# Patient Record
Sex: MALE | Race: WHITE
[De-identification: names, ages, dates, MRNs, and addresses within clinical notes are randomized per-mention and may not be internally consistent; named-entity substitution may affect disease eponyms.]

---

## 2017-01-19 ENCOUNTER — RECORD ABSTRACTING (OUTPATIENT)
Age: 32
End: 2017-01-19

## 2017-01-19 DIAGNOSIS — F17.200 NICOTINE DEPENDENCE, UNSPECIFIED, UNCOMPLICATED: ICD-10-CM

## 2017-01-19 DIAGNOSIS — Z84.0 FAMILY HISTORY OF DISEASES OF THE SKIN AND SUBCUTANEOUS TISSUE: ICD-10-CM

## 2017-01-19 RX ORDER — CLOBETASOL PROPIONATE 0.05 %
0.05 SOLUTION, NON-ORAL TOPICAL
Refills: 0 | Status: ACTIVE | COMMUNITY

## 2017-01-19 RX ORDER — CLOBETASOL PROPIONATE 0.5 MG/G
0.05 OINTMENT TOPICAL
Refills: 0 | Status: ACTIVE | COMMUNITY

## 2017-01-20 ENCOUNTER — RECORD ABSTRACTING (OUTPATIENT)
Age: 32
End: 2017-01-20

## 2017-01-20 DIAGNOSIS — Z87.2 PERSONAL HISTORY OF DISEASES OF THE SKIN AND SUBCUTANEOUS TISSUE: ICD-10-CM

## 2017-02-13 ENCOUNTER — APPOINTMENT (OUTPATIENT)
Dept: DERMATOLOGY | Facility: CLINIC | Age: 32
End: 2017-02-13

## 2017-02-25 ENCOUNTER — MEDICATION RENEWAL (OUTPATIENT)
Age: 32
End: 2017-02-25

## 2017-03-24 ENCOUNTER — APPOINTMENT (OUTPATIENT)
Dept: DERMATOLOGY | Facility: CLINIC | Age: 32
End: 2017-03-24

## 2017-04-11 ENCOUNTER — APPOINTMENT (OUTPATIENT)
Dept: DERMATOLOGY | Facility: CLINIC | Age: 32
End: 2017-04-11

## 2017-04-11 RX ORDER — USTEKINUMAB 90 MG/ML
90 INJECTION, SOLUTION SUBCUTANEOUS
Refills: 0 | Status: ACTIVE | COMMUNITY

## 2017-04-18 ENCOUNTER — APPOINTMENT (OUTPATIENT)
Dept: DERMATOLOGY | Facility: CLINIC | Age: 32
End: 2017-04-18

## 2017-04-18 VITALS — HEIGHT: 74 IN | BODY MASS INDEX: 27.59 KG/M2 | WEIGHT: 215 LBS

## 2017-04-18 DIAGNOSIS — Z91.89 OTHER SPECIFIED PERSONAL RISK FACTORS, NOT ELSEWHERE CLASSIFIED: ICD-10-CM

## 2017-04-18 RX ORDER — USTEKINUMAB 90 MG/ML
90 INJECTION, SOLUTION SUBCUTANEOUS
Refills: 0 | Status: COMPLETED | OUTPATIENT
Start: 2017-04-18

## 2017-04-18 RX ADMIN — Medication 0 MG/ML: at 00:00

## 2017-07-27 RX ORDER — USTEKINUMAB 90 MG/ML
90 INJECTION, SOLUTION SUBCUTANEOUS
Qty: 1 | Refills: 3 | Status: ACTIVE | COMMUNITY
Start: 2017-02-25

## 2017-08-04 ENCOUNTER — APPOINTMENT (OUTPATIENT)
Dept: DERMATOLOGY | Facility: CLINIC | Age: 32
End: 2017-08-04
Payer: MEDICAID

## 2017-08-04 VITALS — WEIGHT: 230 LBS | BODY MASS INDEX: 28.6 KG/M2 | HEIGHT: 75 IN

## 2017-08-04 DIAGNOSIS — L40.0 PSORIASIS VULGARIS: ICD-10-CM

## 2017-08-04 PROCEDURE — 99213 OFFICE O/P EST LOW 20 MIN: CPT

## 2017-08-04 RX ORDER — USTEKINUMAB 90 MG/ML
90 INJECTION, SOLUTION SUBCUTANEOUS
Qty: 0 | Refills: 0 | Status: COMPLETED | OUTPATIENT
Start: 2017-08-04

## 2017-08-04 RX ADMIN — Medication 0 MG/ML: at 00:00

## 2017-11-11 ENCOUNTER — APPOINTMENT (OUTPATIENT)
Dept: DERMATOLOGY | Facility: CLINIC | Age: 32
End: 2017-11-11

## 2018-02-06 ENCOUNTER — EMERGENCY (EMERGENCY)
Facility: HOSPITAL | Age: 33
LOS: 0 days | Discharge: HOME | End: 2018-02-06
Attending: EMERGENCY MEDICINE | Admitting: EMERGENCY MEDICINE
Payer: COMMERCIAL

## 2018-02-06 VITALS
HEIGHT: 75 IN | TEMPERATURE: 98 F | SYSTOLIC BLOOD PRESSURE: 143 MMHG | WEIGHT: 244.93 LBS | OXYGEN SATURATION: 99 % | RESPIRATION RATE: 15 BRPM | DIASTOLIC BLOOD PRESSURE: 89 MMHG | HEART RATE: 96 BPM

## 2018-02-06 DIAGNOSIS — Z04.1 ENCOUNTER FOR EXAMINATION AND OBSERVATION FOLLOWING TRANSPORT ACCIDENT: ICD-10-CM
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
## 2018-02-06 DIAGNOSIS — S39.012A STRAIN OF MUSCLE, FASCIA AND TENDON OF LOWER BACK, INITIAL ENCOUNTER: ICD-10-CM
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
## 2018-02-06 PROCEDURE — 99283 EMERGENCY DEPT VISIT LOW MDM: CPT

## 2018-02-06 RX ORDER — IBUPROFEN 200 MG
600 TABLET ORAL ONCE
Qty: 0 | Refills: 0 | Status: COMPLETED | OUTPATIENT
Start: 2018-02-06 | End: 2018-02-06

## 2018-02-06 RX ADMIN — Medication 600 MILLIGRAM(S): at 11:32

## 2018-02-06 NOTE — ED STATDOCS - NS_ ATTENDINGSCRIBEDETAILS _ED_A_ED_FT
Hermilo Boggs DO (Attending): The history, relevant review of systems, past medical and surgical history, medical decision making, and physical examination was documented by the scribe in my presence and I attest to the accuracy of the documentation.
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
## 2018-02-06 NOTE — ED STATDOCS - ATTENDING CONTRIBUTION TO CARE
I, Hermilo Boggs DO,  performed the initial face to face bedside interview with this patient regarding history of present illness, review of symptoms and relevant past medical, social and family history.  I completed an independent physical examination.  I was the initial provider who evaluated this patient. I have signed out the follow up of any pending tests (i.e. labs, radiological studies) to the ACP.  I have communicated the patient’s plan of care and disposition with the ACP.
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
## 2018-02-06 NOTE — ED STATDOCS - MUSCULOSKELETAL, MLM
No midline CTL tenderness.  No bony tenderness to left shoulder.  Pulses +2, cap refill normal, sensation intact.

## 2020-05-12 ENCOUNTER — INPATIENT (INPATIENT)
Facility: HOSPITAL | Age: 35
LOS: 2 days | Discharge: HOME | DRG: 253 | End: 2020-05-15
Attending: FAMILY MEDICINE | Admitting: FAMILY MEDICINE
Payer: COMMERCIAL

## 2020-05-12 VITALS
DIASTOLIC BLOOD PRESSURE: 98 MMHG | OXYGEN SATURATION: 96 % | RESPIRATION RATE: 18 BRPM | HEIGHT: 75 IN | WEIGHT: 250 LBS | HEART RATE: 130 BPM | TEMPERATURE: 98 F | SYSTOLIC BLOOD PRESSURE: 128 MMHG

## 2020-05-12 DIAGNOSIS — K92.2 GASTROINTESTINAL HEMORRHAGE, UNSPECIFIED: ICD-10-CM

## 2020-05-12 LAB
ALBUMIN SERPL ELPH-MCNC: 3.5 G/DL — SIGNIFICANT CHANGE UP (ref 3.3–5)
ALP SERPL-CCNC: 97 U/L — SIGNIFICANT CHANGE UP (ref 40–120)
ALT FLD-CCNC: 27 U/L — SIGNIFICANT CHANGE UP (ref 12–78)
ANION GAP SERPL CALC-SCNC: 7 MMOL/L — SIGNIFICANT CHANGE UP (ref 5–17)
APTT BLD: 30.4 SEC — SIGNIFICANT CHANGE UP (ref 27.5–36.3)
AST SERPL-CCNC: 19 U/L — SIGNIFICANT CHANGE UP (ref 15–37)
BASOPHILS # BLD AUTO: 0.07 K/UL — SIGNIFICANT CHANGE UP (ref 0–0.2)
BASOPHILS NFR BLD AUTO: 0.5 % — SIGNIFICANT CHANGE UP (ref 0–2)
BILIRUB SERPL-MCNC: 0.6 MG/DL — SIGNIFICANT CHANGE UP (ref 0.2–1.2)
BUN SERPL-MCNC: 32 MG/DL — HIGH (ref 7–23)
CALCIUM SERPL-MCNC: 8.6 MG/DL — SIGNIFICANT CHANGE UP (ref 8.5–10.1)
CHLORIDE SERPL-SCNC: 107 MMOL/L — SIGNIFICANT CHANGE UP (ref 96–108)
CO2 SERPL-SCNC: 23 MMOL/L — SIGNIFICANT CHANGE UP (ref 22–31)
CREAT SERPL-MCNC: 1.07 MG/DL — SIGNIFICANT CHANGE UP (ref 0.5–1.3)
EOSINOPHIL # BLD AUTO: 0.05 K/UL — SIGNIFICANT CHANGE UP (ref 0–0.5)
EOSINOPHIL NFR BLD AUTO: 0.4 % — SIGNIFICANT CHANGE UP (ref 0–6)
GLUCOSE SERPL-MCNC: 123 MG/DL — HIGH (ref 70–99)
HCT VFR BLD CALC: 31.6 % — LOW (ref 39–50)
HCT VFR BLD CALC: 35 % — LOW (ref 39–50)
HGB BLD-MCNC: 10.7 G/DL — LOW (ref 13–17)
HGB BLD-MCNC: 12.1 G/DL — LOW (ref 13–17)
IMM GRANULOCYTES NFR BLD AUTO: 0.5 % — SIGNIFICANT CHANGE UP (ref 0–1.5)
INR BLD: 1.12 RATIO — SIGNIFICANT CHANGE UP (ref 0.88–1.16)
LIDOCAIN IGE QN: 177 U/L — SIGNIFICANT CHANGE UP (ref 73–393)
LYMPHOCYTES # BLD AUTO: 17.5 % — SIGNIFICANT CHANGE UP (ref 13–44)
LYMPHOCYTES # BLD AUTO: 2.26 K/UL — SIGNIFICANT CHANGE UP (ref 1–3.3)
MCHC RBC-ENTMCNC: 30.9 PG — SIGNIFICANT CHANGE UP (ref 27–34)
MCHC RBC-ENTMCNC: 34.6 GM/DL — SIGNIFICANT CHANGE UP (ref 32–36)
MCV RBC AUTO: 89.5 FL — SIGNIFICANT CHANGE UP (ref 80–100)
MONOCYTES # BLD AUTO: 0.81 K/UL — SIGNIFICANT CHANGE UP (ref 0–0.9)
MONOCYTES NFR BLD AUTO: 6.3 % — SIGNIFICANT CHANGE UP (ref 2–14)
NEUTROPHILS # BLD AUTO: 9.67 K/UL — HIGH (ref 1.8–7.4)
NEUTROPHILS NFR BLD AUTO: 74.8 % — SIGNIFICANT CHANGE UP (ref 43–77)
PLATELET # BLD AUTO: 346 K/UL — SIGNIFICANT CHANGE UP (ref 150–400)
POTASSIUM SERPL-MCNC: 4 MMOL/L — SIGNIFICANT CHANGE UP (ref 3.5–5.3)
POTASSIUM SERPL-SCNC: 4 MMOL/L — SIGNIFICANT CHANGE UP (ref 3.5–5.3)
PROT SERPL-MCNC: 7 GM/DL — SIGNIFICANT CHANGE UP (ref 6–8.3)
PROTHROM AB SERPL-ACNC: 12.5 SEC — SIGNIFICANT CHANGE UP (ref 10–12.9)
RBC # BLD: 3.91 M/UL — LOW (ref 4.2–5.8)
RBC # FLD: 12 % — SIGNIFICANT CHANGE UP (ref 10.3–14.5)
SARS-COV-2 RNA SPEC QL NAA+PROBE: SIGNIFICANT CHANGE UP
SODIUM SERPL-SCNC: 137 MMOL/L — SIGNIFICANT CHANGE UP (ref 135–145)
WBC # BLD: 12.92 K/UL — HIGH (ref 3.8–10.5)
WBC # FLD AUTO: 12.92 K/UL — HIGH (ref 3.8–10.5)

## 2020-05-12 PROCEDURE — 88312 SPECIAL STAINS GROUP 1: CPT

## 2020-05-12 PROCEDURE — 99223 1ST HOSP IP/OBS HIGH 75: CPT

## 2020-05-12 PROCEDURE — 88305 TISSUE EXAM BY PATHOLOGIST: CPT

## 2020-05-12 PROCEDURE — 85018 HEMOGLOBIN: CPT

## 2020-05-12 PROCEDURE — 83540 ASSAY OF IRON: CPT

## 2020-05-12 PROCEDURE — P9016: CPT

## 2020-05-12 PROCEDURE — 84100 ASSAY OF PHOSPHORUS: CPT

## 2020-05-12 PROCEDURE — 85027 COMPLETE CBC AUTOMATED: CPT

## 2020-05-12 PROCEDURE — 74177 CT ABD & PELVIS W/CONTRAST: CPT

## 2020-05-12 PROCEDURE — 83036 HEMOGLOBIN GLYCOSYLATED A1C: CPT

## 2020-05-12 PROCEDURE — C9113: CPT

## 2020-05-12 PROCEDURE — 83735 ASSAY OF MAGNESIUM: CPT

## 2020-05-12 PROCEDURE — 85014 HEMATOCRIT: CPT

## 2020-05-12 PROCEDURE — 93010 ELECTROCARDIOGRAM REPORT: CPT

## 2020-05-12 PROCEDURE — 86920 COMPATIBILITY TEST SPIN: CPT

## 2020-05-12 PROCEDURE — 88313 SPECIAL STAINS GROUP 2: CPT

## 2020-05-12 PROCEDURE — 87635 SARS-COV-2 COVID-19 AMP PRB: CPT

## 2020-05-12 PROCEDURE — 85025 COMPLETE CBC W/AUTO DIFF WBC: CPT

## 2020-05-12 PROCEDURE — 82728 ASSAY OF FERRITIN: CPT

## 2020-05-12 PROCEDURE — 36430 TRANSFUSION BLD/BLD COMPNT: CPT

## 2020-05-12 PROCEDURE — 83550 IRON BINDING TEST: CPT

## 2020-05-12 PROCEDURE — 80048 BASIC METABOLIC PNL TOTAL CA: CPT

## 2020-05-12 PROCEDURE — 71045 X-RAY EXAM CHEST 1 VIEW: CPT | Mod: 26

## 2020-05-12 PROCEDURE — 99497 ADVNCD CARE PLAN 30 MIN: CPT

## 2020-05-12 PROCEDURE — 82746 ASSAY OF FOLIC ACID SERUM: CPT

## 2020-05-12 PROCEDURE — 82607 VITAMIN B-12: CPT

## 2020-05-12 PROCEDURE — 80053 COMPREHEN METABOLIC PANEL: CPT

## 2020-05-12 PROCEDURE — 78290 INTESTINE IMAGING: CPT

## 2020-05-12 PROCEDURE — 36415 COLL VENOUS BLD VENIPUNCTURE: CPT

## 2020-05-12 PROCEDURE — A9512: CPT

## 2020-05-12 RX ORDER — CALCIUM CARBONATE 500(1250)
3 TABLET ORAL EVERY 6 HOURS
Refills: 0 | Status: DISCONTINUED | OUTPATIENT
Start: 2020-05-12 | End: 2020-05-15

## 2020-05-12 RX ORDER — ONDANSETRON 8 MG/1
4 TABLET, FILM COATED ORAL EVERY 6 HOURS
Refills: 0 | Status: DISCONTINUED | OUTPATIENT
Start: 2020-05-12 | End: 2020-05-15

## 2020-05-12 RX ORDER — ONDANSETRON 8 MG/1
4 TABLET, FILM COATED ORAL ONCE
Refills: 0 | Status: COMPLETED | OUTPATIENT
Start: 2020-05-12 | End: 2020-05-12

## 2020-05-12 RX ORDER — ACETAMINOPHEN 500 MG
650 TABLET ORAL ONCE
Refills: 0 | Status: DISCONTINUED | OUTPATIENT
Start: 2020-05-12 | End: 2020-05-15

## 2020-05-12 RX ORDER — USTEKINUMAB 45 MG/.5ML
0 INJECTION, SOLUTION SUBCUTANEOUS
Qty: 0 | Refills: 0 | DISCHARGE

## 2020-05-12 RX ORDER — SODIUM CHLORIDE 9 MG/ML
1000 INJECTION, SOLUTION INTRAVENOUS
Refills: 0 | Status: DISCONTINUED | OUTPATIENT
Start: 2020-05-12 | End: 2020-05-14

## 2020-05-12 RX ORDER — SODIUM CHLORIDE 9 MG/ML
1000 INJECTION INTRAMUSCULAR; INTRAVENOUS; SUBCUTANEOUS ONCE
Refills: 0 | Status: COMPLETED | OUTPATIENT
Start: 2020-05-12 | End: 2020-05-12

## 2020-05-12 RX ORDER — PANTOPRAZOLE SODIUM 20 MG/1
8 TABLET, DELAYED RELEASE ORAL
Qty: 80 | Refills: 0 | Status: DISCONTINUED | OUTPATIENT
Start: 2020-05-12 | End: 2020-05-13

## 2020-05-12 RX ORDER — PANTOPRAZOLE SODIUM 20 MG/1
80 TABLET, DELAYED RELEASE ORAL ONCE
Refills: 0 | Status: COMPLETED | OUTPATIENT
Start: 2020-05-12 | End: 2020-05-12

## 2020-05-12 RX ORDER — ACETAMINOPHEN 500 MG
650 TABLET ORAL EVERY 6 HOURS
Refills: 0 | Status: DISCONTINUED | OUTPATIENT
Start: 2020-05-12 | End: 2020-05-15

## 2020-05-12 RX ORDER — ONDANSETRON 8 MG/1
4 TABLET, FILM COATED ORAL ONCE
Refills: 0 | Status: DISCONTINUED | OUTPATIENT
Start: 2020-05-12 | End: 2020-05-15

## 2020-05-12 RX ADMIN — SODIUM CHLORIDE 1000 MILLILITER(S): 9 INJECTION INTRAMUSCULAR; INTRAVENOUS; SUBCUTANEOUS at 12:59

## 2020-05-12 RX ADMIN — PANTOPRAZOLE SODIUM 10 MG/HR: 20 TABLET, DELAYED RELEASE ORAL at 16:10

## 2020-05-12 RX ADMIN — PANTOPRAZOLE SODIUM 80 MILLIGRAM(S): 20 TABLET, DELAYED RELEASE ORAL at 13:14

## 2020-05-12 RX ADMIN — SODIUM CHLORIDE 75 MILLILITER(S): 9 INJECTION, SOLUTION INTRAVENOUS at 19:55

## 2020-05-12 RX ADMIN — ONDANSETRON 4 MILLIGRAM(S): 8 TABLET, FILM COATED ORAL at 21:10

## 2020-05-12 RX ADMIN — ONDANSETRON 4 MILLIGRAM(S): 8 TABLET, FILM COATED ORAL at 12:59

## 2020-05-12 NOTE — ED ADULT TRIAGE NOTE - CHIEF COMPLAINT QUOTE
Patient comes to ED for nausea, and black stools since yesterday. denies any blood thinners. Patient denies any pain or discomfort. denies fevers or chills

## 2020-05-12 NOTE — H&P ADULT - NSHPREVIEWOFSYSTEMS_GEN_ALL_CORE
REVIEW OF SYSTEMS:    CONSTITUTIONAL: No weakness, fevers or chills  EYES/ENT: No visual changes;  No vertigo or throat pain   NECK: No pain or stiffness  RESPIRATORY: No cough, wheezing, hemoptysis; No shortness of breath  CARDIOVASCULAR: No chest pain or palpitations  GASTROINTESTINAL: No abdominal or epigastric pain. +  nausea, no vomiting, or hematemesis; No diarrhea or constipation. +  melena , no  hematochezia.  GENITOURINARY: No dysuria, frequency or hematuria  NEUROLOGICAL: No numbness or weakness  SKIN: No itching, burning, rashes, or lesions   All other review of systems is negative unless indicated above.

## 2020-05-12 NOTE — H&P ADULT - HISTORY OF PRESENT ILLNESS
34M hx gerd presents to the ED for black stool since yesterday. 4 episodes with soft semi formed stools. no dizziness cp sob abd pain. states he takes motrin 2x per week for years. non-drinkers. no pain at this time 33 y/o male with PMH of GERD, psoriasis on  Stelara presents to the  with c/o black stool since yesterday. 4 episodes with soft semi formed stools and nausea . Denies cp, dizziness  sob, abd pain, has some nausea, no vomiting. Patient states takes Motrin 2x per week for years for headaches.     in ED -  T(F): 98.3HR: 94  (94 - 130) BP: 128/98 RR: 18  SpO2: 97% (96% - 97%) Hemoglobin + Hematocrit (05.12.20 @ 17:07)   Hemoglobin: 10.7 g/dL Hematocrit: 31.6 %Complete Blood Count + Automated Diff (05.12.20 @ 12:55)  Hemoglobin: 12.1 g/dL   Mean Cell Hemoglobin: 30.9 pg   Mean Cell Hemoglobin Conc: 34.6 gm/dL  s/p IV fluid and PPI

## 2020-05-12 NOTE — PATIENT PROFILE ADULT - DISASTER - DO YOU WANT TO COMPLETE THE HCP AND NAME A HEALTH CARE AGENT
May contact: Mihaela Bryant (Friend) [phone]             Praveena Duggan (Girlfriend) [phone]/no

## 2020-05-12 NOTE — ED PROVIDER NOTE - NS ED ROS FT
Constitutional: No fever or chills  Eyes: No visual changes  HEENT: No throat pain  CV: No chest pain  Resp: No SOB no cough  GI: No abd pain, nausea or vomiting + gi bleed  : No dysuria  MSK: No musculoskeletal pain  Skin: No rash  Neuro: No headache

## 2020-05-12 NOTE — ED ADULT NURSE NOTE - NSIMPLEMENTINTERV_GEN_ALL_ED
Implemented All Universal Safety Interventions:  Paulina to call system. Call bell, personal items and telephone within reach. Instruct patient to call for assistance. Room bathroom lighting operational. Non-slip footwear when patient is off stretcher. Physically safe environment: no spills, clutter or unnecessary equipment. Stretcher in lowest position, wheels locked, appropriate side rails in place.

## 2020-05-12 NOTE — PATIENT PROFILE ADULT - DISASTER - LONGEST PERIOD TOBACCO-FREE
Discussed results with patient. Will send in bactrim since she is still having some symptoms.    3.5 years quit smoking

## 2020-05-12 NOTE — ED PROVIDER NOTE - PHYSICAL EXAMINATION
Constitutional: NAD AAOx3  Eyes: PERRLA EOMI  Head: Normocephalic atraumatic  Mouth: MMM  Cardiac: tachycardic  Resp: Lungs CTAB  GI: Abd s/nt/nd  Neuro: CN2-12 intact  Skin: No rashes
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
## 2020-05-12 NOTE — H&P ADULT - ASSESSMENT
33 y/o male with PMH of GERD, psoriasis on  Stelara presents to the  with c/o black stool since yesterday. 4 episodes with soft semi formed stools and nausea . Denies cp, dizziness  sob, abd pain, has some nausea, no vomiting. Patient states takes Motrin 2x per week for years for headaches.     in ED -  T(F): 98.3HR: 94  (94 - 130) BP: 128/98 RR: 18  SpO2: 97% (96% - 97%) Hemoglobin + Hematocrit (05.12.20 @ 17:07)   Hemoglobin: 10.7 g/dL Hematocrit: 31.6 %Complete Blood Count + Automated Diff (05.12.20 @ 12:55)  Hemoglobin: 12.1 g/dL   Mean Cell Hemoglobin: 30.9 pg   Mean Cell Hemoglobin Conc: 34.6 gm/dL  s/p IV fluid and PPI     * Melena suspected upper GI bleed with underlying GERD  - med-surg  - IV fluids  - IV PPI  - clear liquids, possible endoscopy eva  - GI evluation  * Anemia due to acute blood loss  - serial h/h monitoring   * Mild leukocytosis , likely reactive repeat in am  * Hyperglycemia- check A1C     Advanced directives  - spend 17 min  - FULL code    DVT proph - IPC  IMPROVE VTE Individual Risk Assessment  RISK                                                                Points  [  ] Previous VTE                                                  3  [  ] Thrombophilia                                               2  [  ] Lower limb paralysis                                      2        (unable to hold up >15 seconds)    [  ] Current Cancer                                              2         (within 6 months)  [  ] Immobilization > 24 hrs                                1  [  ] ICU/CCU stay > 24 hours                              1  [  ] Age > 60                                                      1  IMPROVE VTE Score ______0___    IMPROVE Score 0-1: Low Risk, No VTE prophylaxis required for most patients, encourage ambulation.   IMPROVE Score 2-3: At risk, pharmacologic VTE prophylaxis is indicated for most patients (in the absence of a contraindication)  IMPROVE Score > or = 4: High Risk, pharmacologic VTE prophylaxis is indicated for most patients (in the absence of a contraindication)    Time spend 72 minutes

## 2020-05-12 NOTE — ED PROVIDER NOTE - PROGRESS NOTE DETAILS
33 y/o M with PMH of GERD presents with nausea and black stool since yesterday. Denies fever, chills, vomiting, abdominal pain. States this has never happened in the past. Called GI today, Dr. Powers, advised to go to ED. Pt cannot recall name of GERD medication from home or if he took dose prior to arrival. Denies recent ETOH use, use of iron supplementation or pepto bismol use. PE: Well appearing. Cardiac: s1s2, tachycardic. Lungs: CTAB. Abdomen: NBS x4, soft, nontender. Guaiac +. Lot #[id number], EXP 8/31/20. A/P: UGIB. Plan for labs, protonix, EKG, CXR. GI consult. Likely admission. - Stew Girard PA-C D/w [facility] presley for Dr. Wang. Will follow patient on floor upon admission. - Stew Girard PA-C

## 2020-05-13 ENCOUNTER — RESULT REVIEW (OUTPATIENT)
Age: 35
End: 2020-05-13

## 2020-05-13 LAB
A1C WITH ESTIMATED AVERAGE GLUCOSE RESULT: 4.7 % — SIGNIFICANT CHANGE UP (ref 4–5.6)
ADD ON TEST-SPECIMEN IN LAB: SIGNIFICANT CHANGE UP
ANION GAP SERPL CALC-SCNC: 5 MMOL/L — SIGNIFICANT CHANGE UP (ref 5–17)
BASOPHILS # BLD AUTO: 0.05 K/UL — SIGNIFICANT CHANGE UP (ref 0–0.2)
BASOPHILS NFR BLD AUTO: 0.5 % — SIGNIFICANT CHANGE UP (ref 0–2)
BUN SERPL-MCNC: 22 MG/DL — SIGNIFICANT CHANGE UP (ref 7–23)
CALCIUM SERPL-MCNC: 8 MG/DL — LOW (ref 8.5–10.1)
CHLORIDE SERPL-SCNC: 108 MMOL/L — SIGNIFICANT CHANGE UP (ref 96–108)
CO2 SERPL-SCNC: 27 MMOL/L — SIGNIFICANT CHANGE UP (ref 22–31)
CREAT SERPL-MCNC: 0.89 MG/DL — SIGNIFICANT CHANGE UP (ref 0.5–1.3)
EOSINOPHIL # BLD AUTO: 0.08 K/UL — SIGNIFICANT CHANGE UP (ref 0–0.5)
EOSINOPHIL NFR BLD AUTO: 0.9 % — SIGNIFICANT CHANGE UP (ref 0–6)
ESTIMATED AVERAGE GLUCOSE: 88 MG/DL — SIGNIFICANT CHANGE UP (ref 68–114)
GLUCOSE SERPL-MCNC: 109 MG/DL — HIGH (ref 70–99)
HCT VFR BLD CALC: 22.9 % — LOW (ref 39–50)
HCT VFR BLD CALC: 24.8 % — LOW (ref 39–50)
HCT VFR BLD CALC: 24.9 % — LOW (ref 39–50)
HCT VFR BLD CALC: 25.8 % — LOW (ref 39–50)
HGB BLD-MCNC: 7.9 G/DL — LOW (ref 13–17)
HGB BLD-MCNC: 8.5 G/DL — LOW (ref 13–17)
HGB BLD-MCNC: 8.7 G/DL — LOW (ref 13–17)
HGB BLD-MCNC: 8.8 G/DL — LOW (ref 13–17)
IMM GRANULOCYTES NFR BLD AUTO: 0.4 % — SIGNIFICANT CHANGE UP (ref 0–1.5)
LYMPHOCYTES # BLD AUTO: 2.18 K/UL — SIGNIFICANT CHANGE UP (ref 1–3.3)
LYMPHOCYTES # BLD AUTO: 23.5 % — SIGNIFICANT CHANGE UP (ref 13–44)
MAGNESIUM SERPL-MCNC: 1.8 MG/DL — SIGNIFICANT CHANGE UP (ref 1.6–2.6)
MCHC RBC-ENTMCNC: 31.6 PG — SIGNIFICANT CHANGE UP (ref 27–34)
MCHC RBC-ENTMCNC: 35.1 GM/DL — SIGNIFICANT CHANGE UP (ref 32–36)
MCV RBC AUTO: 90.2 FL — SIGNIFICANT CHANGE UP (ref 80–100)
MONOCYTES # BLD AUTO: 0.58 K/UL — SIGNIFICANT CHANGE UP (ref 0–0.9)
MONOCYTES NFR BLD AUTO: 6.3 % — SIGNIFICANT CHANGE UP (ref 2–14)
NEUTROPHILS # BLD AUTO: 6.33 K/UL — SIGNIFICANT CHANGE UP (ref 1.8–7.4)
NEUTROPHILS NFR BLD AUTO: 68.4 % — SIGNIFICANT CHANGE UP (ref 43–77)
PHOSPHATE SERPL-MCNC: 2.3 MG/DL — LOW (ref 2.5–4.5)
PLATELET # BLD AUTO: 273 K/UL — SIGNIFICANT CHANGE UP (ref 150–400)
POTASSIUM SERPL-MCNC: 3.9 MMOL/L — SIGNIFICANT CHANGE UP (ref 3.5–5.3)
POTASSIUM SERPL-SCNC: 3.9 MMOL/L — SIGNIFICANT CHANGE UP (ref 3.5–5.3)
RBC # BLD: 2.75 M/UL — LOW (ref 4.2–5.8)
RBC # FLD: 12.4 % — SIGNIFICANT CHANGE UP (ref 10.3–14.5)
SODIUM SERPL-SCNC: 140 MMOL/L — SIGNIFICANT CHANGE UP (ref 135–145)
WBC # BLD: 9.26 K/UL — SIGNIFICANT CHANGE UP (ref 3.8–10.5)
WBC # FLD AUTO: 9.26 K/UL — SIGNIFICANT CHANGE UP (ref 3.8–10.5)

## 2020-05-13 PROCEDURE — 99233 SBSQ HOSP IP/OBS HIGH 50: CPT

## 2020-05-13 PROCEDURE — 88312 SPECIAL STAINS GROUP 1: CPT | Mod: 26

## 2020-05-13 PROCEDURE — 88305 TISSUE EXAM BY PATHOLOGIST: CPT | Mod: 26

## 2020-05-13 PROCEDURE — 88313 SPECIAL STAINS GROUP 2: CPT | Mod: 26

## 2020-05-13 PROCEDURE — 74177 CT ABD & PELVIS W/CONTRAST: CPT | Mod: 26

## 2020-05-13 RX ORDER — SOD SULF/SODIUM/NAHCO3/KCL/PEG
4000 SOLUTION, RECONSTITUTED, ORAL ORAL ONCE
Refills: 0 | Status: COMPLETED | OUTPATIENT
Start: 2020-05-13 | End: 2020-05-13

## 2020-05-13 RX ORDER — OXYCODONE HYDROCHLORIDE 5 MG/1
5 TABLET ORAL ONCE
Refills: 0 | Status: DISCONTINUED | OUTPATIENT
Start: 2020-05-13 | End: 2020-05-13

## 2020-05-13 RX ORDER — FENTANYL CITRATE 50 UG/ML
50 INJECTION INTRAVENOUS
Refills: 0 | Status: DISCONTINUED | OUTPATIENT
Start: 2020-05-13 | End: 2020-05-13

## 2020-05-13 RX ORDER — ONDANSETRON 8 MG/1
4 TABLET, FILM COATED ORAL ONCE
Refills: 0 | Status: DISCONTINUED | OUTPATIENT
Start: 2020-05-13 | End: 2020-05-13

## 2020-05-13 RX ADMIN — SODIUM CHLORIDE 75 MILLILITER(S): 9 INJECTION, SOLUTION INTRAVENOUS at 22:38

## 2020-05-13 RX ADMIN — PANTOPRAZOLE SODIUM 10 MG/HR: 20 TABLET, DELAYED RELEASE ORAL at 02:22

## 2020-05-13 RX ADMIN — Medication 4000 MILLILITER(S): at 22:36

## 2020-05-13 RX ADMIN — SODIUM CHLORIDE 75 MILLILITER(S): 9 INJECTION, SOLUTION INTRAVENOUS at 09:23

## 2020-05-13 RX ADMIN — Medication 650 MILLIGRAM(S): at 10:00

## 2020-05-13 NOTE — PROGRESS NOTE ADULT - ASSESSMENT
33 y/o male with PMH of GERD, psoriasis on  Stelara presents to the HH with c/o black stool since yesterday. 4 episodes with soft semi formed stools and nausea . Denies cp, dizziness  sob, abd pain, has some nausea, no vomiting. Patient states takes Motrin 2x per week for years for headaches.     in ED -  T(F): 98.3HR: 94  (94 - 130) BP: 128/98 RR: 18  SpO2: 97% (96% - 97%) Hemoglobin + Hematocrit (05.12.20 @ 17:07)   Hemoglobin: 10.7 g/dL Hematocrit: 31.6 %Complete Blood Count + Automated Diff (05.12.20 @ 12:55)  Hemoglobin: 12.1 g/dL   Mean Cell Hemoglobin: 30.9 pg   Mean Cell Hemoglobin Conc: 34.6 gm/dL  s/p IV fluid and PPI     * Melena suspected upper GI bleed with underlying GERD  - med-surg  - IV fluids,  IV PPI  - 5/13 - s/p EGD Small sliding hiatal hernia, Possible minimal Barretts  - CT abd with IV contrast   - clear liquids,  GI evaluation in process - will need colonoscopy   * Anemia due to acute blood loss, worsening   - 1 unit PRBC   - serial h/h monitoring   * Mild leukocytosis , resolved likely reactive repeat in am  * Hyperglycemia- check A1C  4.7, likely stress induced     Advanced directives  - spend 17 min  - FULL code    DVT proph - IPC    Dispo - IV fluids, IV PPI, 1 unit PRBC , serial h/h , plan for colonoscopy

## 2020-05-13 NOTE — CONSULT NOTE ADULT - ASSESSMENT
Imp:  Melena with anemai, presumed upper GI bleed    Rec:  EGD discussed risks and benefits reviewed and patient agrees

## 2020-05-13 NOTE — CONSULT NOTE ADULT - SUBJECTIVE AND OBJECTIVE BOX
HPI:  33 y/o male with PMH of GERD, psoriasis on  Stelara presents to the HH with c/o black stool since yesterday. 4 episodes with soft semi formed stools and nausea . Denies cp, dizziness  sob, abd pain, has some nausea, no vomiting. Patient states takes Motrin 2x per week for years for headaches.     in ED -  T(F): 98.3HR: 94  (94 - 130) BP: 128/98 RR: 18  SpO2: 97% (96% - 97%) Hemoglobin + Hematocrit (05.12.20 @ 17:07)   Hemoglobin: 10.7 g/dL Hematocrit: 31.6 %Complete Blood Count + Automated Diff (05.12.20 @ 12:55)  Hemoglobin: 12.1 g/dL   Mean Cell Hemoglobin: 30.9 pg   Mean Cell Hemoglobin Conc: 34.6 gm/dL  s/p IV fluid and PPI (12 May 2020 14:34)      PAST MEDICAL & SURGICAL HISTORY:  GERD (gastroesophageal reflux disease)  No significant past surgical history      Home Medications:  pantoprazole 40 mg oral delayed release tablet: 1 tab(s) orally once a day (12 May 2020 14:26)  Stelara PFS 90 mg/mL subcutaneous solution: Inject once every 3 months  ***due end of May*** (12 May 2020 14:26)      MEDICATIONS  (STANDING):  lactated ringers. 1000 milliLiter(s) (75 mL/Hr) IV Continuous <Continuous>  pantoprazole Infusion 8 mG/Hr (10 mL/Hr) IV Continuous <Continuous>    MEDICATIONS  (PRN):  acetaminophen   Tablet .. 650 milliGRAM(s) Oral once PRN Mild Pain (1 - 3)  acetaminophen   Tablet .. 650 milliGRAM(s) Oral every 6 hours PRN Temp greater or equal to 38C (100.4F), Mild Pain (1 - 3)  calcium carbonate    500 mG (Tums) Chewable 3 Tablet(s) Chew every 6 hours PRN Dyspepsia  ondansetron Injectable 4 milliGRAM(s) IV Push every 6 hours PRN Nausea  ondansetron Injectable 4 milliGRAM(s) IV Push once PRN Nausea and/or Vomiting      Allergies    No Known Allergies    Intolerances        SOCIAL HISTORY:    FAMILY HISTORY:  FH: type 2 diabetes (Father)      ROS  As above  Otherwise unremarkable    Vital Signs Last 24 Hrs  T(C): 36.6 (13 May 2020 08:20), Max: 36.8 (12 May 2020 12:15)  T(F): 97.8 (13 May 2020 08:20), Max: 98.3 (12 May 2020 12:15)  HR: 82 (13 May 2020 08:20) (82 - 130)  BP: 111/53 (13 May 2020 08:20) (105/65 - 142/83)  BP(mean): --  RR: 20 (13 May 2020 08:20) (18 - 20)  SpO2: 99% (13 May 2020 08:20) (96% - 100%)    Constitutional: NAD, well-developed  Respiratory: CTAB  Cardiovascular: S1 and S2, RRR  Gastrointestinal: BS+, soft, NT/ND  Extremities: No peripheral edema  Psychiatric: Normal mood, normal affect  Skin: No rashes    LABS:                        8.7    9.26  )-----------( 273      ( 13 May 2020 06:31 )             24.8     05-13    140  |  108  |  22  ----------------------------<  109<H>  3.9   |  27  |  0.89    Ca    8.0<L>      13 May 2020 06:31  Phos  2.3     05-13  Mg     1.8     05-13    TPro  7.0  /  Alb  3.5  /  TBili  0.6  /  DBili  x   /  AST  19  /  ALT  27  /  AlkPhos  97  05-12    PT/INR - ( 12 May 2020 12:55 )   PT: 12.5 sec;   INR: 1.12 ratio         PTT - ( 12 May 2020 12:55 )  PTT:30.4 sec  LIVER FUNCTIONS - ( 12 May 2020 12:55 )  Alb: 3.5 g/dL / Pro: 7.0 gm/dL / ALK PHOS: 97 U/L / ALT: 27 U/L / AST: 19 U/L / GGT: x             RADIOLOGY & ADDITIONAL STUDIES:

## 2020-05-13 NOTE — PROGRESS NOTE ADULT - SUBJECTIVE AND OBJECTIVE BOX
Subjective:  Patient is a 34y old  Male who presents with a chief complaint of nausea, black stool     HPI:  35 y/o male with PMH of GERD, psoriasis on  Stelara presents to the HH with c/o black stool since yesterday. 4 episodes with soft semi formed stools and nausea . Denies cp, dizziness  sob, abd pain, has some nausea, no vomiting. Patient states takes Motrin 2x per week for years for headaches.     in ED -  T(F): 98.3HR: 94  (94 - 130) BP: 128/98 RR: 18  SpO2: 97% (96% - 97%) Hemoglobin + Hematocrit (05.12.20 @ 17:07)   Hemoglobin: 10.7 g/dL Hematocrit: 31.6 %Complete Blood Count + Automated Diff (05.12.20 @ 12:55)  Hemoglobin: 12.1 g/dL   Mean Cell Hemoglobin: 30.9 pg   Mean Cell Hemoglobin Conc: 34.6 gm/dL  s/p IV fluid and PPI (12 May 2020 14:34)    5/13 -  Patient seen and examined at bedside earlier today,  bloody stool overnight, denies abdominal pain, chest pain, dizziness, palpitations, POC discussed    Review of system- Rest of the review of system are negative except mentioned in HPI    OBJECTIVE:   T(C): 36.9 (05-13-20 @ 16:04), Max: 37.6 (05-13-20 @ 12:01)  HR: 86 (05-13-20 @ 12:56) (82 - 90)  BP: 108/61 (05-13-20 @ 16:04) (105/65 - 122/68)  RR: 20 (05-13-20 @ 16:04) (14 - 21)  SpO2: 99% (05-13-20 @ 16:04) (97% - 100%)  Wt(kg): --  Daily     Daily   CAPILLARY BLOOD GLUCOSE        PHYSICAL EXAM:  GENERAL: NAD  NERVOUS SYSTEM:  Alert & Oriented X3, non- focal exam,  Motor Strength 5/5 B/L upper and lower extremities; DTRs 2+ intact and symmetric  HEAD:  Atraumatic, Normocephalic  EYES: EOMI, PERRLA, conjunctiva and sclera clear  HEENT: Moist mucous membranes  NECK: Supple, No JVD  CHEST/LUNG: Clear to auscultation bilaterally; No rales, no rhonchi, no wheezing  HEART: Regular rate and rhythm; No murmurs, no rubs or gallops  ABDOMEN: Soft, Nontender, Nondistended; Bowel sounds present  GENITOURINARY- Voiding, no suprapubic tenderness  EXTREMITIES:  2+ Peripheral Pulses, No clubbing, cyanosis,   edema  MUSCULOSKELETAL:- No muscle tenderness, Muscle tone normal, No joint tenderness, no Joint swelling,  Joint ROM -normal  SKIN-no rash, no lesion    LABS: all reviewed                         7.9    x     )-----------( x        ( 13 May 2020 16:42 )             22.9     05-13    140  |  108  |  22  ----------------------------<  109<H>  3.9   |  27  |  0.89    Ca    8.0<L>      13 May 2020 06:31  Phos  2.3     05-13  Mg     1.8     05-13    TPro  7.0  /  Alb  3.5  /  TBili  0.6  /  DBili  x   /  AST  19  /  ALT  27  /  AlkPhos  97  05-12    PT/INR - ( 12 May 2020 12:55 )   PT: 12.5 sec;   INR: 1.12 ratio         PTT - ( 12 May 2020 12:55 )  PTT:30.4 sec          T(C): 36.9 (05-13-20 @ 16:04), Max: 37.6 (05-13-20 @ 12:01)  HR: 86 (05-13-20 @ 12:56) (82 - 90)  BP: 108/61 (05-13-20 @ 16:04) (105/65 - 122/68)  RR: 20 (05-13-20 @ 16:04) (14 - 21)  SpO2: 99% (05-13-20 @ 16:04) (97% - 100%)  Wt(kg): --      RECENT CULTURES:    RADIOLOGY & ADDITIONAL TESTS: all reviewed   EKG reviewed  < from: 12 Lead ECG (05.12.20 @ 12:21) >  Ventricular Rate 140 BPM    Atrial Rate 140 BPM    P-R Interval 138 ms    QRS Duration 82 ms    Q-T Interval 280 ms    QTC Calculation(Bezet) 427 ms    P Axis 61 degrees    R Axis 5 degrees    T Axis 40 degrees    Diagnosis Line Sinus tachycardia  Otherwise normal ECG  When compared with ECG of 18-NOV-2016 19:00,  Vent. rate has increased BY  77 BPM    < end of copied text >    < from: CT Abdomen and Pelvis w/ Oral Cont and w/ IV Cont (05.13.20 @ 13:44) >  CT of the Abdomen and Pelvis was performed with intravenous contrast.   Intravenous contrast: 90 ml Omnipaque 350. 10 ml discarded.  Oral contrast: positive contrast was administered.  Sagittal and coronal reformats were performed.    FINDINGS:    LOWER CHEST: Within normal limits.    LIVER: Within normal limits.  BILE DUCTS: Normal caliber.  GALLBLADDER: Within normal limits.  SPLEEN: Within normal limits.  PANCREAS: Within normal limits.  ADRENALS: Within normal limits.  KIDNEYS/URETERS: Within normal limits.    BLADDER: Within normal limits.  REPRODUCTIVE ORGANS: Prostate within normal limits.    BOWEL: No bowel obstruction. Appendix normal.  PERITONEUM: No ascites.  VESSELS: Within normal limits.  RETROPERITONEUM/LYMPH NODES: No lymphadenopathy.    ABDOMINAL WALL: Within normal limits.  BONES: Within normal limits.    IMPRESSION:     Unremarkable CT of the abdomen and pelvis. No gastrointestinal abnormality identified.    < end of copied text >        Current medications:  acetaminophen   Tablet .. 650 milliGRAM(s) Oral once PRN  acetaminophen   Tablet .. 650 milliGRAM(s) Oral every 6 hours PRN  calcium carbonate    500 mG (Tums) Chewable 3 Tablet(s) Chew every 6 hours PRN  lactated ringers. 1000 milliLiter(s) IV Continuous <Continuous>  ondansetron Injectable 4 milliGRAM(s) IV Push every 6 hours PRN  ondansetron Injectable 4 milliGRAM(s) IV Push once PRN

## 2020-05-13 NOTE — PROGRESS NOTE ADULT - SUBJECTIVE AND OBJECTIVE BOX
EGD:  Small sliding hiatal hernia  Possible minimal Barretts  No bleeding source    Rec:  CT abd/pel with PO and IV contrast to look for more distal source of bleeding

## 2020-05-14 LAB
ALBUMIN SERPL ELPH-MCNC: 3.1 G/DL — LOW (ref 3.3–5)
ALP SERPL-CCNC: 70 U/L — SIGNIFICANT CHANGE UP (ref 40–120)
ALT FLD-CCNC: 21 U/L — SIGNIFICANT CHANGE UP (ref 12–78)
ANION GAP SERPL CALC-SCNC: 6 MMOL/L — SIGNIFICANT CHANGE UP (ref 5–17)
AST SERPL-CCNC: 14 U/L — LOW (ref 15–37)
BILIRUB SERPL-MCNC: 0.5 MG/DL — SIGNIFICANT CHANGE UP (ref 0.2–1.2)
BUN SERPL-MCNC: 10 MG/DL — SIGNIFICANT CHANGE UP (ref 7–23)
CALCIUM SERPL-MCNC: 8.4 MG/DL — LOW (ref 8.5–10.1)
CHLORIDE SERPL-SCNC: 109 MMOL/L — HIGH (ref 96–108)
CO2 SERPL-SCNC: 28 MMOL/L — SIGNIFICANT CHANGE UP (ref 22–31)
CREAT SERPL-MCNC: 0.9 MG/DL — SIGNIFICANT CHANGE UP (ref 0.5–1.3)
GLUCOSE SERPL-MCNC: 102 MG/DL — HIGH (ref 70–99)
HCT VFR BLD CALC: 23.8 % — LOW (ref 39–50)
HCT VFR BLD CALC: 23.9 % — LOW (ref 39–50)
HCT VFR BLD CALC: 24 % — LOW (ref 39–50)
HGB BLD-MCNC: 8.1 G/DL — LOW (ref 13–17)
HGB BLD-MCNC: 8.3 G/DL — LOW (ref 13–17)
HGB BLD-MCNC: 8.4 G/DL — LOW (ref 13–17)
MCHC RBC-ENTMCNC: 31.2 PG — SIGNIFICANT CHANGE UP (ref 27–34)
MCHC RBC-ENTMCNC: 34.6 GM/DL — SIGNIFICANT CHANGE UP (ref 32–36)
MCV RBC AUTO: 90.2 FL — SIGNIFICANT CHANGE UP (ref 80–100)
PLATELET # BLD AUTO: 247 K/UL — SIGNIFICANT CHANGE UP (ref 150–400)
POTASSIUM SERPL-MCNC: 3.8 MMOL/L — SIGNIFICANT CHANGE UP (ref 3.5–5.3)
POTASSIUM SERPL-SCNC: 3.8 MMOL/L — SIGNIFICANT CHANGE UP (ref 3.5–5.3)
PROT SERPL-MCNC: 5.7 GM/DL — LOW (ref 6–8.3)
RBC # BLD: 2.66 M/UL — LOW (ref 4.2–5.8)
RBC # FLD: 12.6 % — SIGNIFICANT CHANGE UP (ref 10.3–14.5)
SODIUM SERPL-SCNC: 143 MMOL/L — SIGNIFICANT CHANGE UP (ref 135–145)
WBC # BLD: 8.99 K/UL — SIGNIFICANT CHANGE UP (ref 3.8–10.5)
WBC # FLD AUTO: 8.99 K/UL — SIGNIFICANT CHANGE UP (ref 3.8–10.5)

## 2020-05-14 PROCEDURE — 99233 SBSQ HOSP IP/OBS HIGH 50: CPT

## 2020-05-14 RX ORDER — FENTANYL CITRATE 50 UG/ML
25 INJECTION INTRAVENOUS
Refills: 0 | Status: DISCONTINUED | OUTPATIENT
Start: 2020-05-14 | End: 2020-05-14

## 2020-05-14 RX ORDER — ONDANSETRON 8 MG/1
4 TABLET, FILM COATED ORAL ONCE
Refills: 0 | Status: DISCONTINUED | OUTPATIENT
Start: 2020-05-14 | End: 2020-05-14

## 2020-05-14 RX ORDER — OXYCODONE HYDROCHLORIDE 5 MG/1
5 TABLET ORAL ONCE
Refills: 0 | Status: DISCONTINUED | OUTPATIENT
Start: 2020-05-14 | End: 2020-05-14

## 2020-05-14 NOTE — PRE-OP CHECKLIST - ORDERS/MEDICATION ADMINISTRATION RECORD ON CHART
HISTORY OF PRESENT ILLNESS  Jameson Handley is a 57 y.o. male. He is accompanied by his mother. He has an intellectual disability. HPI  He presents for follow up of diabetes mellitus, hypertension and hyperlipidemia. Diet and Lifestyle: generally follows a low fat low cholesterol diet, not attempting to follow a low sodium diet, does not rigorously follow a diabetic diet, exercises sporadically, nonsmoker  Diabetic ROS - medication compliance: compliant all of the time,      home glucose monitoring: not done     home BP Monitorin's/70's.      further diabetic ROS: no polyuria or polydipsia, no numbness, tingling or pain in extremities, no unusual visual symptoms, no hypoglycemia, no medication side effects noted. Cardiovascular ROS:  He denies palpitations, orthopnea, exertional chest pressure/discomfort, claudication, lower extremity edema, dyspnea on exertion, dizziness    He is tried often. He goes to  3 days a week. Helps with Meals on Wheels, walks in park, plays basketball, board games. On days does not go he has appointments or runs errands with mother. He denies depressed mood, anhedonia, insomnia, feelings of worthlessness/guilt and hopelessness. Seizure disorder: He has had no seizures since las visit. He presents for follow up/complaint of pain in left shoulder of one month duration. Last year had problem with right shoulder. Did not benefit much from PT as he could not follow through with home exercise. Quality is aching. Intensity at worst is  7/10 and at least is 0/10 (if not moving it). Pain is Intermittent. It occurs every day. Pain is worse when with movement of shoulder. Pain is alleviated by rest, Tylenol and Aspercreme   Over time pain is the same.        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     Skin cancer      Past Surgical History:   Procedure Laterality Date    ENDOSCOPY, COLON, DIAGNOSTIC  11/08/2007    Dr Lin Estrada normal except small internal hemorrhoids repeat 11/2017     Social History     Socioeconomic History    Marital status: SINGLE     Spouse name: Not on file    Number of children: Not on file    Years of education: Not on file    Highest education level: Not on file   Occupational History     Comment: [employer]    Tobacco Use    Smoking status: Never Smoker    Smokeless tobacco: Never Used   Substance and Sexual Activity    Alcohol use: No    Drug use: No    Sexual activity: Never     Family History   Problem Relation Age of Onset    Other Mother         PMR    Hypertension Mother     Cancer Father         Lung    Diabetes Brother      No Known Allergies  Current Outpatient Medications   Medication Sig Dispense Refill    folic acid (FOLVITE) 1 mg tablet TAKE ONE TABLET BY MOUTH ONE TIME DAILY  90 Tab 3    glimepiride (AMARYL) 4 mg tablet TAKE TWO TABLETS BY MOUTH EVERY MORNING 180 Tab 2    divalproex DR (DEPAKOTE) 500 mg tablet TAKE ONE TABLET BY MOUTH EVERY MORNING AND TWO TABLETS AT NIGHT 270 Tab 1    triamcinolone acetonide (KENALOG) 0.1 % ointment       pioglitazone (ACTOS) 30 mg tablet Take 1 Tab by mouth daily.  Indications: type 2 diabetes mellitus 90 Tab 3    topiramate (TOPAMAX) 200 mg tablet TAKE ONE AND ONE-HALF TABLET BY MOUTH TWICE DAILY 270 Tab 2    ramipril (ALTACE) 5 mg capsule TAKE ONE CAPSULE BY MOUTH ONE TIME DAILY  90 Cap 3    linagliptin (TRADJENTA) 5 mg tablet Take 1 Tab by mouth daily. 90 Tab 3    atorvastatin (LIPITOR) 40 mg tablet take one tablet by mouth at bedtime 90 Tab 3    metFORMIN ER (GLUCOPHAGE XR) 500 mg tablet Take 2 Tabs by mouth two (2) times a day. 360 Tab 3    chlorhexidine (PERIDEX) 0.12 % solution       Calcium-Cholecalciferol, D3, (CALTRATE-600 PLUS VITAMIN D3) 600-400 mg-unit Tab Take  by mouth. Review of Systems   Constitutional: Negative for malaise/fatigue and weight loss. Gastrointestinal: Negative for constipation, diarrhea and heartburn. Musculoskeletal: Negative for back pain and joint pain. Neurological: Negative for dizziness, tingling and focal weakness. Visit Vitals  /81 (BP 1 Location: Left arm, BP Patient Position: Sitting)   Pulse 74   Temp 97.8 °F (36.6 °C) (Oral)   Resp 12   Ht 6' 2\" (1.88 m)   Wt 196 lb (88.9 kg)   SpO2 97%   BMI 25.16 kg/m²     Physical Exam   Constitutional: He is oriented to person, place, and time. He appears well-developed and well-nourished. HENT:   Head: Normocephalic and atraumatic. Eyes: Pupils are equal, round, and reactive to light. Conjunctivae are normal.   Neck: Neck supple. Carotid bruit is not present. No thyromegaly present. Cardiovascular: Normal rate, regular rhythm and normal heart sounds. PMI is not displaced. Exam reveals no gallop. No murmur heard. Pulses:       Dorsalis pedis pulses are 2+ on the right side, and 2+ on the left side. Posterior tibial pulses are 2+ on the right side, and 2+ on the left side. Pulmonary/Chest: Effort normal. He has no wheezes. He has no rhonchi. He has no rales. Abdominal: Soft. Normal appearance. He exhibits no abdominal bruit and no mass. There is no hepatosplenomegaly. There is no tenderness. Musculoskeletal: He exhibits no edema.         Left shoulder: He exhibits decreased range of motion (He will not perform any painful movement.  ) and pain (with abduction and internal rotation. ). He exhibits normal strength. Lymphadenopathy:     He has no cervical adenopathy. Right: No supraclavicular adenopathy present. Left: No supraclavicular adenopathy present. Neurological: He is alert and oriented to person, place, and time. No sensory deficit. Skin: Skin is warm, dry and intact. No rash noted. Psychiatric: He has a normal mood and affect. His behavior is normal.   Nursing note and vitals reviewed. Lab Results   Component Value Date/Time    Hemoglobin A1c 10.2 (H) 01/15/2019 11:41 AM    Hemoglobin A1c (POC) 8.8 04/25/2019 11:30 AM     Lab Results   Component Value Date/Time    Microalb/Creat ratio (ug/mg creat.) <4.3 07/10/2018 11:08 AM     Lab Results   Component Value Date/Time    Cholesterol, total 112 04/25/2019 11:58 AM    HDL Cholesterol 39 (L) 04/25/2019 11:58 AM    LDL, calculated 52 04/25/2019 11:58 AM    VLDL, calculated 21 04/25/2019 11:58 AM    Triglyceride 105 04/25/2019 11:58 AM    CHOL/HDL Ratio 2.8 03/31/2009 08:40 AM     Lab Results   Component Value Date/Time    Sodium 142 04/25/2019 11:58 AM    Potassium 5.0 04/25/2019 11:58 AM    Chloride 108 (H) 04/25/2019 11:58 AM    CO2 22 04/25/2019 11:58 AM    Anion gap 11 02/27/2018 03:53 AM    Glucose 221 (H) 04/25/2019 11:58 AM    BUN 20 04/25/2019 11:58 AM    Creatinine 0.78 04/25/2019 11:58 AM    BUN/Creatinine ratio 26 (H) 04/25/2019 11:58 AM    GFR est  04/25/2019 11:58 AM    GFR est non-AA 97 04/25/2019 11:58 AM    Calcium 9.3 04/25/2019 11:58 AM    Bilirubin, total 0.2 04/25/2019 11:58 AM    AST (SGOT) 13 04/25/2019 11:58 AM    Alk.  phosphatase 47 04/25/2019 11:58 AM    Protein, total 6.4 04/25/2019 11:58 AM    Albumin 3.8 04/25/2019 11:58 AM    Globulin 3.8 02/27/2018 03:53 AM    A-G Ratio 1.5 04/25/2019 11:58 AM    ALT (SGPT) 10 04/25/2019 11:58 AM     Results for orders placed or performed in visit on 08/01/19   XR SHOULDER LT AP/LAT MIN 2 V    Narrative    EXAM: XR SHOULDER LT AP/LAT MIN 2 V    INDICATION: per order. Left shoulder pain    COMPARISON: None. FINDINGS: 2 views of the left shoulder demonstrate no fracture, dislocation or  other acute abnormality. There is chronic appearing calcific tendinitis of the  supraspinatus insertion with minimal subacromial spurring      Impression    IMPRESSION:   1. Mild subacromial spurring with minimal calcific tendinitis of the  supraspinatus. Results for orders placed or performed in visit on 08/01/19   MICROALBUMIN, UR, RAND W/ MICROALB/CREAT RATIO   Result Value Ref Range    Creatinine, urine 163.8 Not Estab. mg/dL    Microalbumin, urine 7.3 Not Estab. ug/mL    Microalb/Creat ratio (ug/mg creat.) 4.5 0.0 - 30.0 mg/g creat    Narrative    Performed at:  [address]  [id number]  : Shamika Coates MD, Phone:  [phone]   AMB POC HEMOGLOBIN A1C   Result Value Ref Range    Hemoglobin A1c (POC) 8.1 %     ASSESSMENT and PLAN    ICD-10-CM ICD-9-CM    1. Uncontrolled type 2 diabetes mellitus without complication, without long-term current use of insulin (HCC) E11.65 250.02 MICROALBUMIN, UR, RAND W/ MICROALB/CREAT RATIO      AMB POC HEMOGLOBIN A1C      pioglitazone (ACTOS) 45 mg tablet   2. Hypertension, essential I10 401.9    3. Hypercholesterolemia E78.00 272.0    4. Seizure disorder ([facility] G40.909 345.90    5. Acute pain of left shoulder M25.512 719.41 CANCELED: XR SHOULDER LEFT 3 VIEW     Diagnoses and all orders for this visit:    1. Uncontrolled type 2 diabetes mellitus without complication, without long-term current use of insulin (HCC)  Diabetes Mellitus: improved, but A1c is still too high. Our goal for him is less than 8.0 and he is close to that. . Is taking statin and ACE/ARB  Issues reviewed with him: low cholesterol diet, weight control and daily exercise discussed and glycohemoglobin and other lab monitoring discussed. -     MICROALBUMIN, UR, RAND W/ MICROALB/CREAT RATIO  -     AMB POC HEMOGLOBIN A1C  -     pioglitazone (ACTOS) 45 mg tablet; Take 1 Tab by mouth daily. Indications: type 2 diabetes mellitus    2. Hypertension, essential  Hypertension is controlled. 3. Hypercholesterolemia  Hyperlipidemia is controlled    4. Seizure disorder ([facility]  Stable with no recent seizures. 5. Acute pain of left shoulder  He has both arthritis and tendinitis. We can try injection for tendinitis and see if he improved. Follow-up and Dispositions    · Return in about 3 months (around 11/1/2019) for DM, HTN, chol. POC A1c.       lab results and schedule of future lab studies reviewed with patient  reviewed diet, exercise and weight control  radiology results and schedule of future radiology studies reviewed with patient  I have discussed the diagnosis, evaluation and treatment options and the intended plan with the patient. Patient understands and is in agreement. The patient has received an after-visit summary and questions were answered concerning future plans. I have discussed side effects and warnings of any new medications with the patient as well. done

## 2020-05-14 NOTE — PROGRESS NOTE ADULT - ASSESSMENT
33 y/o male with PMH of GERD, psoriasis on  Stelara presents to the HH with c/o black stool since yesterday. 4 episodes with soft semi formed stools and nausea . Denies cp, dizziness  sob, abd pain, has some nausea, no vomiting. Patient states takes Motrin 2x per week for years for headaches.     in ED -  T(F): 98.3HR: 94  (94 - 130) BP: 128/98 RR: 18  SpO2: 97% (96% - 97%) Hemoglobin + Hematocrit (05.12.20 @ 17:07)   Hemoglobin: 10.7 g/dL Hematocrit: 31.6 %Complete Blood Count + Automated Diff (05.12.20 @ 12:55)  Hemoglobin: 12.1 g/dL   Mean Cell Hemoglobin: 30.9 pg   Mean Cell Hemoglobin Conc: 34.6 gm/dL  s/p IV fluid and PPI     * Melena suspected upper GI bleed with underlying GERD suspected small bowel bleeding  s/p normal EGD and colonoscopy   - IV fluids,  IV PPI  - 5/13 - s/p EGD Small sliding hiatal hernia, Possible minimal Barretts  - 5/14 - s/p colonoscopy - normal   - plan for bleeding scan vs Meckel's scan and capsule endoscopy as per GI   - CT abd with IV contrast  - noted   * Anemia due to acute blood loss, worsening   - s/p 1 unit PRBC  5/13   - serial h/h monitoring  Hb 8.4  * Mild leukocytosis , resolved likely reactive repeat in am  * Hyperglycemia- check A1C  4.7, likely stress induced     Advanced directives  - spend 17 min  - FULL code    DVT proph - IPC    Dispo - IV fluids, PPI, serial h/h , GI work-up

## 2020-05-14 NOTE — PROGRESS NOTE ADULT - SUBJECTIVE AND OBJECTIVE BOX
Subjective:  Patient is a 34y old  Male who presents with a chief complaint of nausea, black stool     HPI:  35 y/o male with PMH of GERD, psoriasis on  Stelara presents to the HH with c/o black stool since yesterday. 4 episodes with soft semi formed stools and nausea . Denies cp, dizziness  sob, abd pain, has some nausea, no vomiting. Patient states takes Motrin 2x per week for years for headaches.     in ED -  T(F): 98.3HR: 94  (94 - 130) BP: 128/98 RR: 18  SpO2: 97% (96% - 97%) Hemoglobin + Hematocrit (05.12.20 @ 17:07)   Hemoglobin: 10.7 g/dL Hematocrit: 31.6 %Complete Blood Count + Automated Diff (05.12.20 @ 12:55)  Hemoglobin: 12.1 g/dL   Mean Cell Hemoglobin: 30.9 pg   Mean Cell Hemoglobin Conc: 34.6 gm/dL  s/p IV fluid and PPI (12 May 2020 14:34)    5/13 -  Patient seen and examined at bedside earlier today,  bloody stool overnight, denies abdominal pain, chest pain, dizziness, palpitations, POC discussed  5/14 - pt seen and examined, tolerated blood transfusion well, denies cp, dyspnea, abdominal pain, plan for colonoscopy     Review of system- Rest of the review of system are negative except mentioned in HPI    OBJECTIVE:   T(C): 36.6 (05-14-20 @ 14:49), Max: 36.9 (05-13-20 @ 16:04)  T(F): 97.9 (05-14-20 @ 14:49), Max: 98.5 (05-14-20 @ 14:15)  HR: 77 (05-14-20 @ 14:15) (77 - 105)  BP: 128/71 (05-14-20 @ 14:49) (90/41 - 136/68)  RR: 18 (05-14-20 @ 14:49) (14 - 20)  SpO2: 100% (05-14-20 @ 14:49) (98% - 100%)  Wt(kg): --  CAPILLARY BLOOD GLUCOSE        PHYSICAL EXAM:  GENERAL: NAD  NERVOUS SYSTEM:  Alert & Oriented X3, non- focal exam,  Motor Strength 5/5 B/L upper and lower extremities; DTRs 2+ intact and symmetric  HEAD:  Atraumatic, Normocephalic  EYES: EOMI, PERRLA, conjunctiva and sclera clear  HEENT: Moist mucous membranes  NECK: Supple, No JVD  CHEST/LUNG: Clear to auscultation bilaterally; No rales, no rhonchi, no wheezing  HEART: Regular rate and rhythm; No murmurs, no rubs or gallops  ABDOMEN: Soft, Nontender, Nondistended; Bowel sounds present  GENITOURINARY- Voiding, no suprapubic tenderness  EXTREMITIES:  2+ Peripheral Pulses, No clubbing, cyanosis,   edema  MUSCULOSKELETAL:- No muscle tenderness, Muscle tone normal, No joint tenderness, no Joint swelling,  Joint ROM -normal  SKIN-no rash, no lesion    LABS: all reviewed   05-14    143  |  109<H>  |  10  ----------------------------<  102<H>  3.8   |  28  |  0.90    Ca    8.4<L>      14 May 2020 07:00  Phos  2.3     05-13  Mg     1.8     05-13    TPro  5.7<L>  /  Alb  3.1<L>  /  TBili  0.5  /  DBili  x   /  AST  14<L>  /  ALT  21  /  AlkPhos  70  05-14                       8.4    x     )-----------( x        ( 14 May 2020 11:30 )             23.9     LIVER FUNCTIONS - ( 14 May 2020 07:00 )  Alb: 3.1 g/dL / Pro: 5.7 gm/dL / ALK PHOS: 70 U/L / ALT: 21 U/L / AST: 14 U/L / GGT: x                                         7.9    x     )-----------( x        ( 13 May 2020 16:42 )             22.9     05-13    140  |  108  |  22  ----------------------------<  109<H>  3.9   |  27  |  0.89    Ca    8.0<L>      13 May 2020 06:31  Phos  2.3     05-13  Mg     1.8     05-13    TPro  7.0  /  Alb  3.5  /  TBili  0.6  /  DBili  x   /  AST  19  /  ALT  27  /  AlkPhos  97  05-12    PT/INR - ( 12 May 2020 12:55 )   PT: 12.5 sec;   INR: 1.12 ratio         PTT - ( 12 May 2020 12:55 )  PTT:30.4 sec          T(C): 36.9 (05-13-20 @ 16:04), Max: 37.6 (05-13-20 @ 12:01)  HR: 86 (05-13-20 @ 12:56) (82 - 90)  BP: 108/61 (05-13-20 @ 16:04) (105/65 - 122/68)  RR: 20 (05-13-20 @ 16:04) (14 - 21)  SpO2: 99% (05-13-20 @ 16:04) (97% - 100%)  Wt(kg): --      RECENT CULTURES:    RADIOLOGY & ADDITIONAL TESTS: all reviewed   EKG reviewed  < from: Upper Endoscopy (05.13.20 @ 11:11) >  IMPRESS Impression:          - Small hiatal hernia.    IMPRESS                      - R/O Escobedo's    IMPRESS                      - Normal stomach.    IMPRESS                      - Normal examined duodenum.    IMPRESS                      - No bleeding source identified    ENDORECOMMENDATION Recommendation:      - Await pathology results.      < end of copied text >  < from: Colonoscopy (05.14.20 @ 13:24) >  IMPRESS Impression:          - Minimal amount of darker liquid in the colon,     IMPRESS                      suggestive of blood from more proximally    IMPRESS                      - The examination was otherwise normal.    IMPRESS                      - No specimens collected.    ENDORECOMMENDATION Recommendation:      - Continue present medications.    < end of copied text >    < from: 12 Lead ECG (05.12.20 @ 12:21) >  Ventricular Rate 140 BPM    Atrial Rate 140 BPM    P-R Interval 138 ms    QRS Duration 82 ms    Q-T Interval 280 ms    QTC Calculation(Bezet) 427 ms    P Axis 61 degrees    R Axis 5 degrees    T Axis 40 degrees    Diagnosis Line Sinus tachycardia  Otherwise normal ECG  When compared with ECG of 18-NOV-2016 19:00,  Vent. rate has increased BY  77 BPM    < end of copied text >    < from: CT Abdomen and Pelvis w/ Oral Cont and w/ IV Cont (05.13.20 @ 13:44) >  CT of the Abdomen and Pelvis was performed with intravenous contrast.   Intravenous contrast: 90 ml Omnipaque 350. 10 ml discarded.  Oral contrast: positive contrast was administered.  Sagittal and coronal reformats were performed.    FINDINGS:    LOWER CHEST: Within normal limits.    LIVER: Within normal limits.  BILE DUCTS: Normal caliber.  GALLBLADDER: Within normal limits.  SPLEEN: Within normal limits.  PANCREAS: Within normal limits.  ADRENALS: Within normal limits.  KIDNEYS/URETERS: Within normal limits.    BLADDER: Within normal limits.  REPRODUCTIVE ORGANS: Prostate within normal limits.    BOWEL: No bowel obstruction. Appendix normal.  PERITONEUM: No ascites.  VESSELS: Within normal limits.  RETROPERITONEUM/LYMPH NODES: No lymphadenopathy.    ABDOMINAL WALL: Within normal limits.  BONES: Within normal limits.    IMPRESSION:     Unremarkable CT of the abdomen and pelvis. No gastrointestinal abnormality identified.    < end of copied text >        Current medications:  acetaminophen   Tablet .. 650 milliGRAM(s) Oral once PRN  acetaminophen   Tablet .. 650 milliGRAM(s) Oral every 6 hours PRN  calcium carbonate    500 mG (Tums) Chewable 3 Tablet(s) Chew every 6 hours PRN  lactated ringers. 1000 milliLiter(s) IV Continuous <Continuous>  ondansetron Injectable 4 milliGRAM(s) IV Push every 6 hours PRN  ondansetron Injectable 4 milliGRAM(s) IV Push once PRN

## 2020-05-14 NOTE — PROGRESS NOTE ADULT - SUBJECTIVE AND OBJECTIVE BOX
Colon to TI  Minimal darker fluid in colon suggesting old blood from more proximally  O/W normal    Favor a small bowel bleed, which has stopped for now    Rec:  Options include bleeding scan if there is more active bleeding again, otherwise elective Meckel's scan and capsule endoscopy

## 2020-05-15 ENCOUNTER — TRANSCRIPTION ENCOUNTER (OUTPATIENT)
Age: 35
End: 2020-05-15

## 2020-05-15 VITALS
OXYGEN SATURATION: 98 % | RESPIRATION RATE: 16 BRPM | DIASTOLIC BLOOD PRESSURE: 59 MMHG | HEART RATE: 85 BPM | SYSTOLIC BLOOD PRESSURE: 117 MMHG | TEMPERATURE: 98 F

## 2020-05-15 LAB
HCT VFR BLD CALC: 24.9 % — LOW (ref 39–50)
HGB BLD-MCNC: 8.5 G/DL — LOW (ref 13–17)

## 2020-05-15 PROCEDURE — 78290 INTESTINE IMAGING: CPT | Mod: 26

## 2020-05-15 PROCEDURE — 99239 HOSP IP/OBS DSCHRG MGMT >30: CPT

## 2020-05-15 RX ORDER — SODIUM FERRIC GLUCONAT/SUCROSE 62.5MG/5ML
125 AMPUL (ML) INTRAVENOUS ONCE
Refills: 0 | Status: COMPLETED | OUTPATIENT
Start: 2020-05-15 | End: 2020-05-15

## 2020-05-15 RX ORDER — FAMOTIDINE 10 MG/ML
20 INJECTION INTRAVENOUS ONCE
Refills: 0 | Status: COMPLETED | OUTPATIENT
Start: 2020-05-15 | End: 2020-05-15

## 2020-05-15 RX ORDER — FAMOTIDINE 10 MG/ML
20 INJECTION INTRAVENOUS ONCE
Refills: 0 | Status: DISCONTINUED | OUTPATIENT
Start: 2020-05-15 | End: 2020-05-15

## 2020-05-15 RX ORDER — PANTOPRAZOLE SODIUM 20 MG/1
1 TABLET, DELAYED RELEASE ORAL
Qty: 0 | Refills: 0 | DISCHARGE

## 2020-05-15 RX ORDER — GLUCAGON INJECTION, SOLUTION 0.5 MG/.1ML
1 INJECTION, SOLUTION SUBCUTANEOUS ONCE
Refills: 0 | Status: COMPLETED | OUTPATIENT
Start: 2020-05-15 | End: 2020-05-15

## 2020-05-15 RX ORDER — PANTOPRAZOLE SODIUM 20 MG/1
1 TABLET, DELAYED RELEASE ORAL
Qty: 30 | Refills: 0
Start: 2020-05-15 | End: 2020-06-13

## 2020-05-15 RX ADMIN — GLUCAGON INJECTION, SOLUTION 1 MILLIGRAM(S): 0.5 INJECTION, SOLUTION SUBCUTANEOUS at 11:05

## 2020-05-15 RX ADMIN — Medication 110 MILLIGRAM(S): at 01:08

## 2020-05-15 RX ADMIN — FAMOTIDINE 20 MILLIGRAM(S): 10 INJECTION INTRAVENOUS at 10:23

## 2020-05-15 NOTE — DISCHARGE NOTE PROVIDER - CARE PROVIDER_API CALL
Ajith Mar  FAMILY MEDICINE  [address] [place] [address]  Phone: [phone]  Fax: [phone]  Follow Up Time: 1 week    [facility]  GASTROENTEROLOGY  [address] [place] [id number]  Phone: [phone]  Fax: [phone]  Follow Up Time: 1 week

## 2020-05-15 NOTE — DISCHARGE NOTE PROVIDER - NSDCCPCAREPLAN_GEN_ALL_CORE_FT
PRINCIPAL DISCHARGE DIAGNOSIS  Diagnosis: Upper GI bleed  Assessment and Plan of Treatment: follow up with Dr. Arcos within 1 week      SECONDARY DISCHARGE DIAGNOSES  Diagnosis: Psoriasis  Assessment and Plan of Treatment: continue Stelara    Diagnosis: Anemia due to acute blood loss  Assessment and Plan of Treatment: follow up with PCP within 1 week to repeat blood work

## 2020-05-15 NOTE — DISCHARGE NOTE PROVIDER - HOSPITAL COURSE
Patient is a 34y old  Male who presents with a chief complaint of nausea, black stool         HPI:    35 y/o male with PMH of GERD, psoriasis on  Stelara presents to the HH with c/o black stool since yesterday. 4 episodes with soft semi formed stools and nausea . Denies cp, dizziness  sob, abd pain, has some nausea, no vomiting. Patient states takes Motrin 2x per week for years for headaches.         in ED -  T(F): 98.3HR: 94  (94 - 130) BP: 128/98 RR: 18  SpO2: 97% (96% - 97%) Hemoglobin + Hematocrit (05.12.20 @ 17:07)   Hemoglobin: 10.7 g/dL Hematocrit: 31.6 %Complete Blood Count + Automated Diff (05.12.20 @ 12:55)  Hemoglobin: 12.1 g/dL   Mean Cell Hemoglobin: 30.9 pg   Mean Cell Hemoglobin Conc: 34.6 gm/dL    s/p IV fluid and PPI (12 May 2020 14:34)        5/13 -  Patient seen and examined at bedside earlier today,  bloody stool overnight, denies abdominal pain, chest pain, dizziness, palpitations, POC discussed    5/14 - pt seen and examined, tolerated blood transfusion well, denies cp, dyspnea, abdominal pain, plan for colonoscopy     5/15 - pt seen and examined, normal BM today, no blood, denies abdominal pain, chest pain, dyspnea, POC discussed     Review of system- Rest of the review of system are negative except mentioned in HPI    T(C): 36.6 (05-15-20 @ 08:19), Max: 36.7 (05-14-20 @ 23:01)    T(F): 97.8 (05-15-20 @ 08:19), Max: 98.1 (05-14-20 @ 23:01)    HR: 85 (05-15-20 @ 08:19) (77 - 85)    BP: 117/59 (05-15-20 @ 08:19) (111/65 - 117/59)    RR: 16 (05-15-20 @ 08:19) (16 - 18)    SpO2: 98% (05-15-20 @ 08:19) (98% - 100%)    Wt(kg): --    NERVOUS SYSTEM:  Alert & Oriented X3, non- focal exam,    Motor Strength 5/5 B/L upper and lower extremities; DTRs 2+ intact and symmetric    HEAD:  Atraumatic, Normocephalic    EYES: EOMI, PERRLA, conjunctiva and sclera clear    HEENT: Moist mucous membranes    NECK: Supple, No JVD    CHEST/LUNG: Clear to auscultation bilaterally; No rales, no rhonchi, no wheezing    HEART: Regular rate and rhythm; No murmurs, no rubs or gallops    ABDOMEN: Soft, Nontender, Nondistended; Bowel sounds present    GENITOURINARY- Voiding, no suprapubic tenderness    EXTREMITIES:  2+ Peripheral Pulses, No clubbing, cyanosis,   edema    * Melena suspected upper GI bleed with underlying GERD suspected small bowel bleeding  s/p normal EGD and colonoscopy     - IV fluids,  IV PPI    - 5/13 - s/p EGD Small sliding hiatal hernia, Possible minimal Barretts    - 5/14 - s/p colonoscopy - normal     - s/p  Meckel's scan - negative 5/15 , plan for o/p capsule endoscopy as per GI     - CT abd with IV contrast  - noted     * Anemia due to acute blood loss, worsening     - s/p 1 unit PRBC  5/13 , 5/14 - s/p IV iron     - serial h/h monitoring stable     * Mild leukocytosis , resolved likely reactive repeat in am    * Hyperglycemia- check A1C  4.7, likely stress induced     * Psoriasis - c/w stelara     Advanced directives    - spend 17 min    - FULL code    Disposition - medically optimized to be discharged home with close follow up with PCP and GI within 1 week     return to ED if fever, abdominal pain, nausea, vomiting, chest pain, dyspnea    Discharge plan discussed with patient, RN    Patient advised to follow up with PCP within 3-7 days    time spend 40 min    Discharge note faxed to PCP with my contact information to call me back     PCP Dr. Mar

## 2020-05-15 NOTE — DISCHARGE NOTE PROVIDER - NSDCFUADDINST_GEN_ALL_CORE_FT
Hemoglobin + Hematocrit (05.15.20 @ 06:36)    Hemoglobin: 8.5 g/dL    Hematocrit: 24.9 %  FINDINGS: The examination shows physiological distribution of radiopharmaceutical in the abdomen and pelvis. No abnormal activity is seen to suggest the presence of functioning ectopic gastric mucosa.     IMPRESSION: Normal Meckel's scan.No evidence of functioning ectopic gastric mucosa.    < end of copied text >      LOWER CHEST: Within normal limits.    LIVER: Within normal limits.  BILE DUCTS: Normal caliber.  GALLBLADDER: Within normal limits.  SPLEEN: Within normal limits.  PANCREAS: Within normal limits.  ADRENALS: Within normal limits.  KIDNEYS/URETERS: Within normal limits.    BLADDER: Within normal limits.  REPRODUCTIVE ORGANS: Prostate within normal limits.    BOWEL: No bowel obstruction. Appendix normal.  PERITONEUM: No ascites.  VESSELS: Within normal limits.  RETROPERITONEUM/LYMPH NODES: No lymphadenopathy.    ABDOMINAL WALL: Within normal limits.  BONES: Within normal limits.    IMPRESSION:     Unremarkable CT of the abdomen and pelvis. No gastrointestinal abnormality identified.    < end of copied text >

## 2020-05-15 NOTE — PROGRESS NOTE ADULT - ASSESSMENT
Imp:  GI bleed, negative EGD and colonoscopy    Rec:  Meckel's scan today  If neg, OK by me for d/c  F/U outpatient and I will arrange capsule endoscopy early next week

## 2020-05-15 NOTE — DISCHARGE NOTE PROVIDER - PROVIDER TOKENS
PROVIDER:[TOKEN:[[id number]],FOLLOWUP:[1 week]],PROVIDER:[TOKEN:[[id number]],FOLLOWUP:[1 week]]

## 2020-05-15 NOTE — DISCHARGE NOTE NURSING/CASE MANAGEMENT/SOCIAL WORK - PATIENT PORTAL LINK FT
You can access the FollowMyHealth Patient Portal offered by [facility] by registering at the following website: [email or web address]. By joining [employer]’s FollowMyHealth portal, you will also be able to view your health information using other applications (apps) compatible with our system.

## 2020-05-15 NOTE — PROGRESS NOTE ADULT - SUBJECTIVE AND OBJECTIVE BOX
Patient is a 34y old  Male who presents with a chief complaint of nausea, black stool (14 May 2020 15:57)      Subective:  No bleeding or complaints    PAST MEDICAL & SURGICAL HISTORY:  GERD (gastroesophageal reflux disease)  No significant past surgical history      MEDICATIONS  (STANDING):    MEDICATIONS  (PRN):  acetaminophen   Tablet .. 650 milliGRAM(s) Oral once PRN Mild Pain (1 - 3)  acetaminophen   Tablet .. 650 milliGRAM(s) Oral every 6 hours PRN Temp greater or equal to 38C (100.4F), Mild Pain (1 - 3)  calcium carbonate    500 mG (Tums) Chewable 3 Tablet(s) Chew every 6 hours PRN Dyspepsia  ondansetron Injectable 4 milliGRAM(s) IV Push every 6 hours PRN Nausea  ondansetron Injectable 4 milliGRAM(s) IV Push once PRN Nausea and/or Vomiting      REVIEW OF SYSTEMS:    RESPIRATORY: No shortness of breath  CARDIOVASCULAR: No chest pain  All other review of systems is negative unless indicated above.    Vital Signs Last 24 Hrs  T(C): 36.7 (14 May 2020 23:01), Max: 36.9 (14 May 2020 14:15)  T(F): 98.1 (14 May 2020 23:01), Max: 98.5 (14 May 2020 14:15)  HR: 77 (14 May 2020 23:01) (77 - 91)  BP: 111/65 (14 May 2020 23:01) (90/41 - 128/71)  BP(mean): --  RR: 18 (14 May 2020 23:01) (14 - 20)  SpO2: 100% (14 May 2020 23:01) (100% - 100%)    PHYSICAL EXAM:    Constitutional: NAD, well-developed  Respiratory: CTAB  Cardiovascular: S1 and S2, RRR  Gastrointestinal: BS+, soft, NT/ND  Extremities: No peripheral edema  Psychiatric: Normal mood, normal affect    LABS:                        8.5    x     )-----------( x        ( 15 May 2020 06:36 )             24.9     05-14    143  |  109<H>  |  10  ----------------------------<  102<H>  3.8   |  28  |  0.90    Ca    8.4<L>      14 May 2020 07:00    TPro  5.7<L>  /  Alb  3.1<L>  /  TBili  0.5  /  DBili  x   /  AST  14<L>  /  ALT  21  /  AlkPhos  70  05-14      LIVER FUNCTIONS - ( 14 May 2020 07:00 )  Alb: 3.1 g/dL / Pro: 5.7 gm/dL / ALK PHOS: 70 U/L / ALT: 21 U/L / AST: 14 U/L / GGT: x             RADIOLOGY & ADDITIONAL STUDIES:

## 2020-05-15 NOTE — DISCHARGE NOTE PROVIDER - NSDCMRMEDTOKEN_GEN_ALL_CORE_FT
pantoprazole 40 mg oral delayed release tablet: 1 tab(s) orally once a day  Stelara PFS 90 mg/mL subcutaneous solution: Inject once every 3 months  ***due end of May***

## 2020-05-18 DIAGNOSIS — L40.9 PSORIASIS, UNSPECIFIED: ICD-10-CM

## 2020-05-18 DIAGNOSIS — K22.70 BARRETT'S ESOPHAGUS WITHOUT DYSPLASIA: ICD-10-CM

## 2020-05-18 DIAGNOSIS — D72.829 ELEVATED WHITE BLOOD CELL COUNT, UNSPECIFIED: ICD-10-CM

## 2020-05-18 DIAGNOSIS — K92.2 GASTROINTESTINAL HEMORRHAGE, UNSPECIFIED: ICD-10-CM

## 2020-05-18 DIAGNOSIS — K21.9 GASTRO-ESOPHAGEAL REFLUX DISEASE WITHOUT ESOPHAGITIS: ICD-10-CM

## 2020-05-18 DIAGNOSIS — R73.9 HYPERGLYCEMIA, UNSPECIFIED: ICD-10-CM

## 2020-05-18 DIAGNOSIS — K29.70 GASTRITIS, UNSPECIFIED, WITHOUT BLEEDING: ICD-10-CM

## 2020-05-18 DIAGNOSIS — K44.9 DIAPHRAGMATIC HERNIA WITHOUT OBSTRUCTION OR GANGRENE: ICD-10-CM

## 2020-05-18 DIAGNOSIS — D62 ACUTE POSTHEMORRHAGIC ANEMIA: ICD-10-CM

## 2020-11-02 ENCOUNTER — EMERGENCY (EMERGENCY)
Facility: HOSPITAL | Age: 35
LOS: 0 days | Discharge: HOME | End: 2020-11-02
Attending: EMERGENCY MEDICINE
Payer: COMMERCIAL

## 2020-11-02 VITALS
HEIGHT: 75 IN | SYSTOLIC BLOOD PRESSURE: 131 MMHG | WEIGHT: 220.02 LBS | TEMPERATURE: 99 F | OXYGEN SATURATION: 95 % | DIASTOLIC BLOOD PRESSURE: 82 MMHG | HEART RATE: 92 BPM | RESPIRATION RATE: 16 BRPM

## 2020-11-02 DIAGNOSIS — M25.532 PAIN IN LEFT WRIST: ICD-10-CM
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
## 2020-11-02 DIAGNOSIS — Z79.899 OTHER LONG TERM (CURRENT) DRUG THERAPY: ICD-10-CM
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
## 2020-11-02 DIAGNOSIS — K21.9 GASTRO-ESOPHAGEAL REFLUX DISEASE WITHOUT ESOPHAGITIS: ICD-10-CM

## 2020-11-02 PROCEDURE — 73110 X-RAY EXAM OF WRIST: CPT | Mod: 26,LT

## 2020-11-02 PROCEDURE — 73110 X-RAY EXAM OF WRIST: CPT | Mod: LT

## 2020-11-02 PROCEDURE — 99283 EMERGENCY DEPT VISIT LOW MDM: CPT | Mod: 25

## 2020-11-02 PROCEDURE — 29125 APPL SHORT ARM SPLINT STATIC: CPT

## 2020-11-02 PROCEDURE — 29125 APPL SHORT ARM SPLINT STATIC: CPT | Mod: LT

## 2020-11-02 NOTE — ED STATDOCS - CROS ED MUSC ALL NEG
Patient:   BHASKAR WEAVER            MRN: [id number]            FIN: [id number]              Age:   37 years     Sex:  FEMALE     :  [birth year]   Associated Diagnoses:   None   Author:   SUELLEN Arteaga         HPI: Pt is a 36yo female who was assessed by doctoral psychology student Bryant Jackson and this Integrated Behavioral Health psychologist initially for decisional capacity, though the consult was changed to address coping with new amputation and history of depression and anxiety. Pt reported that she her mood had improved from yesterday. Pt, however, appeared to have difficulty expressing her emotions, and was likely guarded. Pt reported that  when her bandages are changed on her legs, she experiences significant pain for \"minutes.\" She described this pain as the hardest part of being in the hospital. Pt stated almost immediately that she feels like her legs are still there, even though they are not physically there. She reported that her legs were amputated on 3/8/19. Pt denied any current depressive and anxiety symptoms, though appeared to have a blunted affect with  psychomotor retardation. Per Pt's sister, Pt has a history of depression. Pt denied suicidality, homicidality, a/v hallucinations, and hypomania/alia.    Psychiatric Hx: Pt denied any history of mental health issues and any psychiatric treatments. Pt denies history of psychiatric hospitalization or suicide attempts. Pt denies symptoms of alia/hypomania.    Medical Problems:  Asthma  Blood coagulation disorder  DVT - Deep vein thrombosis  Encounter for postoperative care  H/O: blood transfusion  PVD - Peripheral vascular disease  Sinus tachycardia seen on cardiac monitor      Medications (27) Active  Scheduled: (12)  *Do NOT give IM Injections  1 each, N/A, Daily  *Magnesium Protocol Communication  1 each, N/A, Daily  *Phosphate Protocol Communication  1 each, N/A, Daily  *Potassium Protocol Communication  1 each, N/A,  Daily  Docusate sodium 100 mg/10 mL oral liquid repack  100 mg 10 mL, Oral, BID  Gabapentin 50 mg/mL oral soln syringe  300 mg 6 mL, Oral, Q8H  Insulin human lispro 10 unit/0.1 mL inj  2-12 units, Subcutaneous, Q6H  MetoCLOPramide 10 mg/2 mL inj SDV  5 mg 1 mL, Slow IV Push, Q6H  metroNIDAZOLE-NaCl 0.9%  500 mg 100 mL, IVPB, Q12H (variable)  Polyethylene glycol 3350 oral recon powder 17 gm packet UD  17 gm 1 packet, Oral, Daily  Senna 8.8 mg/5 mL oral syrup repack  17.6 mg 10 mL, OG-tube, Daily  Sodium chloride PF 0.9% flush inj 10 mL  10 mL, Flush, Q12H  Continuous: (3)  argatroban 125 mg [2 mcg/kg/min] + Sodium Chloride 0.9% 125 mL  125 mL, IV, 11.88 mL/hr  Lactated Ringers 1,000 mL  1,000 mL, IV, 55 mL/hr  TPN adult custom CENTRAL LINE 1,500 mL  1,500 mL, IV, 62.5 mL/hr  PRN: (12)  Acetaminophen 500 mg tab  500 mg 1 tab, Oral, Q6H  Albuterol 0.083% 2.5 mg/3 mL nebulizer soln  2.5 mg 3 mL, Nebulizer, Q6H  Dextrose (glucose) 40% 15 gm/37.5 gm oral gel UD  15 gm, Oral, As Directed PRN  Dextrose (glucose) 50% 25 gm/50 mL syringe  12.5 gm 25 mL, IV Push, As Directed PRN  FentaNYL 100 mcg/2 mL inj SDV  50 mcg 1 mL, IV Push, Q4H  HydrALAZINE 20 mg/1 mL inj SDV  10 mg 0.5 mL, Slow IV Push, Q6H  Labetalol 20 mg/4 mL inj  10 mg 2 mL, Slow IV Push, Q4H  Morphine 10 mg/5 mL oral liquid UD  15 mg 7.5 mL, Oral, Q4H  Ondansetron 4 mg/2 mL inj SDV  4 mg 2 mL, Slow IV Push, One Time (unscheduled)  Sodium chloride PF 0.9% flush inj 10 mL  10 mL, Flush, As Directed PRN  Sodium chloride PF 0.9% flush inj 10 mL  20 mL, Flush, As Directed PRN  TraMADol 50 mg tab  50 mg 1 tab, Oral, Q4H      Allergies (1) Active Reaction  vancomycin None Documented    Social History: Pt reported that she was BAR in [place]. Pt reported that she currently lives with her aunt and her cousin who has 3 children. Pt reported that she has completed 3 years of college. Pt reported that she went to school for automotive services and massage. 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 Pt reported that her family has been visiting her while she has been in the hospital. Pt described herself as the person who \" lifts people up.\"    Chemical Dependency Hx: Pt reported that she drinks socially a couple times per week. Pt reported that she smokes 2 Black and Mild cigarettes per day. Pt denied recreational drug use.    Family Hx: Pt denied any family history of psychiatric and substance use concerns.    Mental Status Exam:  Pt's attitude appeared guarded. \"Better than yesterday\" mood, affect blunted. Pt was oriented x 3; did not know the city. Concentration appeared distracted. Speech was slow, soft, minimal. Thoughts appeared linear. Pt denied SI, HI, hallucinations. Behavior was observed to be WNL. Poor-fair insight, judgment appears fair.    Diagnosis:  Adjustment disorder with depressed mood  R/O Major depresssive disorder    Conclusion: Pt was seen to address copingwith new amputation and history of depression and anxiety. Pt appeared to have a blunted affect. She spoke minimally, softly, and slowly. She denied any psychological concerns currently or historically. Of note, her sister reported to [facility] psychologist that Pt has a history of depression. Pt may have difficulty expressing negative emotions as she sees herself as the caregiver and the person \"who  lifts people up.\" Pt may not feel comfortable sharing/experiencing negative emotions. Pt appeared to have difficulty concentrating which could be related to her fever or related to depressive mood symptoms that could make it difficulty to focus. Pt said that the hard part about being in the hospital is when her wound dressings are changed due to the pain that it causes. Pt was encouraged to practice deep breathing and mindfulness during  those moments as well as to practice when she is not in pain to help reduce her distress in those moments. Pt reported a strong support system, which likely helps her to cope with her  current physical and emotional concerns. She would however likely benefit from an outlet like individual therapy to work on coping skills and processing of the loss of her legs.    Recommendations: Pt was provided psychoeducation on the mind-body connection. Pt was provided psychoeducation on the effects of stress on the body. Pt was provided psychoeducation on deep breathing and mindfulness practice. Pt practiced deep breathing. Pt was recommended to participate in individual therapy.    Thank you for allowing this writer to participate in this pt's care. Please contact this writer at the Integrated Behavioral Health Team at [phone] if you have any questions or concerns.   - - -

## 2020-11-02 NOTE — ED ADULT TRIAGE NOTE - CHIEF COMPLAINT QUOTE
Patient comes in with L wrist pain x 1 day. patient states he tripped and fell this morning walking his dog. no loc/no headstrike.

## 2020-11-02 NOTE — ED STATDOCS - PROGRESS NOTE DETAILS
Tong: XR wrist negative for fracture or dislocation. Placed in thumb spica splint. Advised to follow up with ortho outpatient for possible MRI. Patient stable for discharge.

## 2020-11-02 NOTE — ED STATDOCS - PATIENT PORTAL LINK FT
You can access the FollowMyHealth Patient Portal offered by [facility] by registering at the following website: [email or web address]. By joining [employer]’s FollowMyHealth portal, you will also be able to view your health information using other applications (apps) compatible with our system.

## 2020-11-02 NOTE — ED STATDOCS - ATTENDING CONTRIBUTION TO CARE
play kip I personally saw the patient with the resident, and completed the key components of the history and physical exam. I then discussed the management plan with the resident.

## 2020-11-02 NOTE — ED STATDOCS - NSFOLLOWUPINSTRUCTIONS_ED_ALL_ED_FT
Your diagnosis: left wrist pain    Discharge instructions:    - Please follow up in 5-7 days with Dr. Rome, hand surgeon.    - Keep splint completely dry up until your follow-up visit. For the first 1-3 days, elevate extremity as much as you can and apply ice to it 2-4 times per day for a maximum 15-20 minutes each time. Use crutches or a cane to help you ambulate if needed.    - Tylenol up to 650 mg every 8 hours as needed for pain and/or Motrin up to 600 mg every 6 hours as needed for pain.     - Be sure to return to the ED if you develop new or worsening symptoms. Specific signs and symptoms to be vigilant of: increasing or intractable pain that does not improve with a short trial of elevation and ice, burning or stinging pain, new numbness or tingling, skin findings of any new warmth, redness, discoloration, breakdown, or discharge (clear or purulent) from under or near the splinted area, increasing paralysis, malodorous splint, fevers or chills, nausea or vomiting, persistent lightheadedness.

## 2020-11-02 NOTE — ED STATDOCS - OBJECTIVE STATEMENT
34 y/o male with a PMHx of GERD, psoriasis, presents to the ED c/o left wrist pain s/p trip and fall this morning. Pt states he tripped and fell this morning walking his dog injuring his left wrist. Denies head strike, LOC. Pt states he felt fine after fall but throughout the day had increased pain and difficulty holding items, pain worsening with movement. No other complaints at this time. NKDA.

## 2020-11-02 NOTE — ED STATDOCS - CARE PROVIDER_API CALL
Moshe Rome  ORTHOPAEDIC SURGERY  [address] [place] [address]  Phone: [phone]  Fax: [phone]  Follow Up Time: 7-10 Days

## 2020-11-02 NOTE — ED ADULT TRIAGE NOTE - WEIGHT IN LBS
Patient is requesting refill for      ADDERALL XR 25 MG 24 hr capsule 30 capsule       Pharmacy [facility]      Please advise patient when called in.  Thank you.   220

## 2023-08-04 ENCOUNTER — NON-APPOINTMENT (OUTPATIENT)
Age: 38
End: 2023-08-04

## 2023-09-18 ENCOUNTER — NON-APPOINTMENT (OUTPATIENT)
Age: 38
End: 2023-09-18

## 2023-09-30 ENCOUNTER — NON-APPOINTMENT (OUTPATIENT)
Age: 38
End: 2023-09-30

## 2023-10-01 ENCOUNTER — EMERGENCY (EMERGENCY)
Facility: HOSPITAL | Age: 38
LOS: 0 days | Discharge: HOME | End: 2023-10-01
Attending: STUDENT IN AN ORGANIZED HEALTH CARE EDUCATION/TRAINING PROGRAM
Payer: MEDICAID

## 2023-10-01 VITALS
TEMPERATURE: 99 F | HEART RATE: 106 BPM | SYSTOLIC BLOOD PRESSURE: 107 MMHG | HEIGHT: 75 IN | OXYGEN SATURATION: 99 % | RESPIRATION RATE: 18 BRPM | DIASTOLIC BLOOD PRESSURE: 87 MMHG | WEIGHT: 259.93 LBS

## 2023-10-01 VITALS
SYSTOLIC BLOOD PRESSURE: 110 MMHG | DIASTOLIC BLOOD PRESSURE: 72 MMHG | OXYGEN SATURATION: 98 % | TEMPERATURE: 99 F | RESPIRATION RATE: 16 BRPM | HEART RATE: 89 BPM

## 2023-10-01 DIAGNOSIS — S80.811A ABRASION, RIGHT LOWER LEG, INITIAL ENCOUNTER: ICD-10-CM

## 2023-10-01 DIAGNOSIS — Z23 ENCOUNTER FOR IMMUNIZATION: ICD-10-CM

## 2023-10-01 DIAGNOSIS — S80.11XA CONTUSION OF RIGHT LOWER LEG, INITIAL ENCOUNTER: ICD-10-CM

## 2023-10-01 DIAGNOSIS — M79.661 PAIN IN RIGHT LOWER LEG: ICD-10-CM

## 2023-10-01 DIAGNOSIS — K21.9 GASTRO-ESOPHAGEAL REFLUX DISEASE WITHOUT ESOPHAGITIS: ICD-10-CM
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
## 2023-10-01 LAB
ALBUMIN SERPL ELPH-MCNC: 3.8 G/DL — SIGNIFICANT CHANGE UP (ref 3.3–5)
ALP SERPL-CCNC: 105 U/L — SIGNIFICANT CHANGE UP (ref 40–120)
ALT FLD-CCNC: 35 U/L — SIGNIFICANT CHANGE UP (ref 12–78)
ANION GAP SERPL CALC-SCNC: 3 MMOL/L — LOW (ref 5–17)
APTT BLD: 32.3 SEC — SIGNIFICANT CHANGE UP (ref 24.5–35.6)
AST SERPL-CCNC: 27 U/L — SIGNIFICANT CHANGE UP (ref 15–37)
BASOPHILS # BLD AUTO: 0.09 K/UL — SIGNIFICANT CHANGE UP (ref 0–0.2)
BASOPHILS NFR BLD AUTO: 0.6 % — SIGNIFICANT CHANGE UP (ref 0–2)
BILIRUB SERPL-MCNC: 1.3 MG/DL — HIGH (ref 0.2–1.2)
BLD GP AB SCN SERPL QL: SIGNIFICANT CHANGE UP
BUN SERPL-MCNC: 13 MG/DL — SIGNIFICANT CHANGE UP (ref 7–23)
CALCIUM SERPL-MCNC: 9.3 MG/DL — SIGNIFICANT CHANGE UP (ref 8.5–10.1)
CHLORIDE SERPL-SCNC: 103 MMOL/L — SIGNIFICANT CHANGE UP (ref 96–108)
CK SERPL-CCNC: 355 U/L — HIGH (ref 26–308)
CO2 SERPL-SCNC: 29 MMOL/L — SIGNIFICANT CHANGE UP (ref 22–31)
CREAT SERPL-MCNC: 1.04 MG/DL — SIGNIFICANT CHANGE UP (ref 0.5–1.3)
CRP SERPL-MCNC: 88 MG/L — HIGH
EGFR: 94 ML/MIN/1.73M2 — SIGNIFICANT CHANGE UP
EOSINOPHIL # BLD AUTO: 1.23 K/UL — HIGH (ref 0–0.5)
EOSINOPHIL NFR BLD AUTO: 8.4 % — HIGH (ref 0–6)
GLUCOSE SERPL-MCNC: 105 MG/DL — HIGH (ref 70–99)
HCT VFR BLD CALC: 39 % — SIGNIFICANT CHANGE UP (ref 39–50)
HGB BLD-MCNC: 13.8 G/DL — SIGNIFICANT CHANGE UP (ref 13–17)
IMM GRANULOCYTES NFR BLD AUTO: 0.3 % — SIGNIFICANT CHANGE UP (ref 0–0.9)
INR BLD: 1.05 RATIO — SIGNIFICANT CHANGE UP (ref 0.85–1.18)
LACTATE SERPL-SCNC: 1.2 MMOL/L — SIGNIFICANT CHANGE UP (ref 0.7–2)
LYMPHOCYTES # BLD AUTO: 1.34 K/UL — SIGNIFICANT CHANGE UP (ref 1–3.3)
LYMPHOCYTES # BLD AUTO: 9.1 % — LOW (ref 13–44)
MCHC RBC-ENTMCNC: 31.7 PG — SIGNIFICANT CHANGE UP (ref 27–34)
MCHC RBC-ENTMCNC: 35.4 GM/DL — SIGNIFICANT CHANGE UP (ref 32–36)
MCV RBC AUTO: 89.7 FL — SIGNIFICANT CHANGE UP (ref 80–100)
MONOCYTES # BLD AUTO: 1.3 K/UL — HIGH (ref 0–0.9)
MONOCYTES NFR BLD AUTO: 8.8 % — SIGNIFICANT CHANGE UP (ref 2–14)
NEUTROPHILS # BLD AUTO: 10.72 K/UL — HIGH (ref 1.8–7.4)
NEUTROPHILS NFR BLD AUTO: 72.8 % — SIGNIFICANT CHANGE UP (ref 43–77)
PLATELET # BLD AUTO: 392 K/UL — SIGNIFICANT CHANGE UP (ref 150–400)
POTASSIUM SERPL-MCNC: 4 MMOL/L — SIGNIFICANT CHANGE UP (ref 3.5–5.3)
POTASSIUM SERPL-SCNC: 4 MMOL/L — SIGNIFICANT CHANGE UP (ref 3.5–5.3)
PROT SERPL-MCNC: 7.4 GM/DL — SIGNIFICANT CHANGE UP (ref 6–8.3)
PROTHROM AB SERPL-ACNC: 11.8 SEC — SIGNIFICANT CHANGE UP (ref 9.5–13)
RBC # BLD: 4.35 M/UL — SIGNIFICANT CHANGE UP (ref 4.2–5.8)
RBC # FLD: 12.1 % — SIGNIFICANT CHANGE UP (ref 10.3–14.5)
SODIUM SERPL-SCNC: 135 MMOL/L — SIGNIFICANT CHANGE UP (ref 135–145)
WBC # BLD: 14.72 K/UL — HIGH (ref 3.8–10.5)
WBC # FLD AUTO: 14.72 K/UL — HIGH (ref 3.8–10.5)

## 2023-10-01 PROCEDURE — 73562 X-RAY EXAM OF KNEE 3: CPT | Mod: 26,RT

## 2023-10-01 PROCEDURE — 85025 COMPLETE CBC W/AUTO DIFF WBC: CPT

## 2023-10-01 PROCEDURE — 85730 THROMBOPLASTIN TIME PARTIAL: CPT

## 2023-10-01 PROCEDURE — 83605 ASSAY OF LACTIC ACID: CPT

## 2023-10-01 PROCEDURE — 96375 TX/PRO/DX INJ NEW DRUG ADDON: CPT | Mod: XU

## 2023-10-01 PROCEDURE — 36415 COLL VENOUS BLD VENIPUNCTURE: CPT

## 2023-10-01 PROCEDURE — 90715 TDAP VACCINE 7 YRS/> IM: CPT

## 2023-10-01 PROCEDURE — 82550 ASSAY OF CK (CPK): CPT

## 2023-10-01 PROCEDURE — 86900 BLOOD TYPING SEROLOGIC ABO: CPT

## 2023-10-01 PROCEDURE — 73590 X-RAY EXAM OF LOWER LEG: CPT | Mod: RT

## 2023-10-01 PROCEDURE — 86140 C-REACTIVE PROTEIN: CPT

## 2023-10-01 PROCEDURE — 85610 PROTHROMBIN TIME: CPT

## 2023-10-01 PROCEDURE — 90471 IMMUNIZATION ADMIN: CPT

## 2023-10-01 PROCEDURE — 73610 X-RAY EXAM OF ANKLE: CPT | Mod: RT

## 2023-10-01 PROCEDURE — 86901 BLOOD TYPING SEROLOGIC RH(D): CPT

## 2023-10-01 PROCEDURE — 99284 EMERGENCY DEPT VISIT MOD MDM: CPT | Mod: 25

## 2023-10-01 PROCEDURE — 73590 X-RAY EXAM OF LOWER LEG: CPT | Mod: 26,RT

## 2023-10-01 PROCEDURE — 73562 X-RAY EXAM OF KNEE 3: CPT | Mod: RT

## 2023-10-01 PROCEDURE — 96374 THER/PROPH/DIAG INJ IV PUSH: CPT | Mod: XU

## 2023-10-01 PROCEDURE — 99285 EMERGENCY DEPT VISIT HI MDM: CPT

## 2023-10-01 PROCEDURE — 80053 COMPREHEN METABOLIC PANEL: CPT

## 2023-10-01 PROCEDURE — 73701 CT LOWER EXTREMITY W/DYE: CPT | Mod: 26,RT,MA

## 2023-10-01 PROCEDURE — 96376 TX/PRO/DX INJ SAME DRUG ADON: CPT | Mod: XU

## 2023-10-01 PROCEDURE — 86850 RBC ANTIBODY SCREEN: CPT

## 2023-10-01 PROCEDURE — 73701 CT LOWER EXTREMITY W/DYE: CPT | Mod: MA,RT

## 2023-10-01 PROCEDURE — 73610 X-RAY EXAM OF ANKLE: CPT | Mod: 26,RT

## 2023-10-01 PROCEDURE — 87040 BLOOD CULTURE FOR BACTERIA: CPT

## 2023-10-01 RX ORDER — CEFTRIAXONE 500 MG/1
1000 INJECTION, POWDER, FOR SOLUTION INTRAMUSCULAR; INTRAVENOUS ONCE
Refills: 0 | Status: DISCONTINUED | OUTPATIENT
Start: 2023-10-01 | End: 2023-10-01

## 2023-10-01 RX ORDER — MORPHINE SULFATE 50 MG/1
4 CAPSULE, EXTENDED RELEASE ORAL ONCE
Refills: 0 | Status: DISCONTINUED | OUTPATIENT
Start: 2023-10-01 | End: 2023-10-01

## 2023-10-01 RX ORDER — CEPHALEXIN 500 MG
1 CAPSULE ORAL
Qty: 28 | Refills: 0
Start: 2023-10-01 | End: 2023-10-07

## 2023-10-01 RX ORDER — CEFTRIAXONE 500 MG/1
1000 INJECTION, POWDER, FOR SOLUTION INTRAMUSCULAR; INTRAVENOUS ONCE
Refills: 0 | Status: COMPLETED | OUTPATIENT
Start: 2023-10-01 | End: 2023-10-01

## 2023-10-01 RX ORDER — TETANUS TOXOID, REDUCED DIPHTHERIA TOXOID AND ACELLULAR PERTUSSIS VACCINE, ADSORBED 5; 2.5; 8; 8; 2.5 [IU]/.5ML; [IU]/.5ML; UG/.5ML; UG/.5ML; UG/.5ML
0.5 SUSPENSION INTRAMUSCULAR ONCE
Refills: 0 | Status: COMPLETED | OUTPATIENT
Start: 2023-10-01 | End: 2023-10-01

## 2023-10-01 RX ADMIN — CEFTRIAXONE 1000 MILLIGRAM(S): 500 INJECTION, POWDER, FOR SOLUTION INTRAMUSCULAR; INTRAVENOUS at 16:50

## 2023-10-01 RX ADMIN — MORPHINE SULFATE 4 MILLIGRAM(S): 50 CAPSULE, EXTENDED RELEASE ORAL at 16:50

## 2023-10-01 RX ADMIN — MORPHINE SULFATE 4 MILLIGRAM(S): 50 CAPSULE, EXTENDED RELEASE ORAL at 14:15

## 2023-10-01 RX ADMIN — TETANUS TOXOID, REDUCED DIPHTHERIA TOXOID AND ACELLULAR PERTUSSIS VACCINE, ADSORBED 0.5 MILLILITER(S): 5; 2.5; 8; 8; 2.5 SUSPENSION INTRAMUSCULAR at 21:47

## 2023-10-01 NOTE — ED PROVIDER NOTE - PROGRESS NOTE DETAILS
Received signout on this patient, patient seen by orthopedics and surgery, state patient is okay to go home at this time.  We will send Keflex to the pharmacy.  Did discuss that patient should not be given any opioid pain medicine so that he should return to the ER if his pain were to worsen at home despite elevation and icing and anti-inflammatories with setting of possible compartment syndrome.  CT scan shows hematoma.  Patient agreeable with this plan.  Plan to discharge patient. Return to ED precautions were discussed with the patient/family. All questions were answered. Ajith Colunga MD.

## 2023-10-01 NOTE — ED PROVIDER NOTE - MUSCULOSKELETAL, MLM
Spine appears normal, range of motion is not limited, right anterior tibia has bruising and swelling and tenderness, abrasion along front of shin, no ankle or knee pain, no visible open fracture

## 2023-10-01 NOTE — CONSULT NOTE ADULT - SUBJECTIVE AND OBJECTIVE BOX
***Incomplete***    Patient is a 37yMale who presents to [facility] ED w/ a c/o of right leg pain. Patient states that he tripped down stairs two days ago. Denies head-strike or LOC. Patient states that the pain has increased over the past day. Denies any numbness or tingling. Denies having any other pain elsewhere. No other orthopedic concerns at this time.    No pertinent past medical history    GERD (gastroesophageal reflux disease)      No Known Allergies      PHYSICAL EXAM:  T(C): 37.2 (10-01-23 @ 12:23), Max: 37.2 (10-01-23 @ 12:23)  HR: 106 (10-01-23 @ 12:23) (106 - 106)  BP: 107/87 (10-01-23 @ 12:23) (107/87 - 107/87)  RR: 18 (10-01-23 @ 12:23) (18 - 18)  SpO2: 99% (10-01-23 @ 12:23) (99% - 99%)    Gen: NAD, Resting comfortably    RLE:  Compartments compressible  TTP greatest at anterior compartment with +TTP presents throughout lower leg  Able to actively range ankle and toes with moderate pain  +EHL/FHL/TA/GSC  +SILT L3-S1  + DP      Secondary Assessment:  NC/AT, NTTP of clavicles, NTTP of C-,T-,L-Spine, NTTP of Pelvis  RUE: NTTP of Shoulder, Elbow, Wrist, Hand; NT with AROM/PROM of Shoulder, Elbow, Wrist, Hand; AIN/PIN/Med/Uln/Msc/Rad/Ax intact  LUE: NTTP of Shoulder, Elbow, Wrist, Hand; NT with AROM/PROM of Shoulder, Elbow, Wrist, Hand; AIN/PIN/Med/Uln/Msc/Rad/Ax intact  LLE: Able to SLR, NT with Log Roll, NT with Heel Strike, NTTP of Hip, Knee, Ankle, Foot; NT with AROM/PROM of Hip, Knee, Ankle, Foot; Q/H/Gsc/TA/EHL/FHL intact    Imaging:      A/P: 37M who presents s/p mechanical fall with increasing severe right lower leg pain    Analgesia  WBAT  PT/OT  Ice and elevate as tolerated  Will discuss with Dr. Hartmann and will advise changes to the plan as necessary ***Incomplete***    Patient is a 37yMale who presents to [place] ED w/ a c/o of right leg pain. Patient states that he tripped down stairs two days ago and hit his leg on a . Denies head-strike or LOC. Patient states that the pain has increased over the past day. He states that he was initially able to ambulate, but has lost the ability today due to increased pain. Denies any numbness or tingling. Patient denies any use of anticoagulation. Denies having any other pain elsewhere. No other orthopedic concerns at this time.    No pertinent past medical history    GERD (gastroesophageal reflux disease)      No Known Allergies      PHYSICAL EXAM:  T(C): 37.2 (10-01-23 @ 12:23), Max: 37.2 (10-01-23 @ 12:23)  HR: 106 (10-01-23 @ 12:23) (106 - 106)  BP: 107/87 (10-01-23 @ 12:23) (107/87 - 107/87)  RR: 18 (10-01-23 @ 12:23) (18 - 18)  SpO2: 99% (10-01-23 @ 12:23) (99% - 99%)    Gen: NAD, Resting comfortably    RLE:  10 cm linear abrasion present over anterior tibia  Compartments compressible  TTP greatest at anterior compartment with +TTP presents throughout lower leg  Able to actively range ankle and toes with moderate pain  +EHL/FHL/TA/GSC  +SILT L3-S1  + DP      Secondary Assessment:  NC/AT, NTTP of clavicles, NTTP of C-,T-,L-Spine, NTTP of Pelvis  RUE: NTTP of Shoulder, Elbow, Wrist, Hand; NT with AROM/PROM of Shoulder, Elbow, Wrist, Hand; AIN/PIN/Med/Uln/Msc/Rad/Ax intact  LUE: NTTP of Shoulder, Elbow, Wrist, Hand; NT with AROM/PROM of Shoulder, Elbow, Wrist, Hand; AIN/PIN/Med/Uln/Msc/Rad/Ax intact  LLE: Able to SLR, NT with Log Roll, NT with Heel Strike, NTTP of Hip, Knee, Ankle, Foot; NT with AROM/PROM of Hip, Knee, Ankle, Foot; Q/H/Gsc/TA/EHL/FHL intact    Imaging: No obvious fracture detected on films.    ***Incomplete***    A/P: 37M who presents s/p mechanical fall with increasing severe right lower leg pain    Analgesia  WBAT  PT/OT  Ice and elevate as tolerated  Will discuss with Dr. Hartmann and will advise changes to the plan as necessary Patient is a 37yMale who presents to [facility] [facility] ED w/ a c/o of right leg pain. Patient states that he tripped down stairs two days ago and hit his leg on a . Denies head-strike or LOC. Patient states that the pain has increased over the past day. He states that he was initially able to ambulate, but has lost the ability today due to increased pain. Denies any numbness or tingling. Patient denies any use of anticoagulation. Denies having any other pain elsewhere. No other orthopedic concerns at this time.    No pertinent past medical history    GERD (gastroesophageal reflux disease)      No Known Allergies      PHYSICAL EXAM:  T(C): 37.2 (10-01-23 @ 12:23), Max: 37.2 (10-01-23 @ 12:23)  HR: 106 (10-01-23 @ 12:23) (106 - 106)  BP: 107/87 (10-01-23 @ 12:23) (107/87 - 107/87)  RR: 18 (10-01-23 @ 12:23) (18 - 18)  SpO2: 99% (10-01-23 @ 12:23) (99% - 99%)    Gen: NAD, Resting comfortably    RLE:  10 cm linear abrasion present over anterior tibia  Compartments compressible  TTP greatest at anterior compartment with +TTP presents throughout lower leg  Able to actively range ankle and toes with moderate pain  +EHL/FHL/TA/GSC  +SILT L3-S1  + DP      Secondary Assessment:  NC/AT, NTTP of clavicles, NTTP of C-,T-,L-Spine, NTTP of Pelvis  RUE: NTTP of Shoulder, Elbow, Wrist, Hand; NT with AROM/PROM of Shoulder, Elbow, Wrist, Hand; AIN/PIN/Med/Uln/Msc/Rad/Ax intact  LUE: NTTP of Shoulder, Elbow, Wrist, Hand; NT with AROM/PROM of Shoulder, Elbow, Wrist, Hand; AIN/PIN/Med/Uln/Msc/Rad/Ax intact  LLE: Able to SLR, NT with Log Roll, NT with Heel Strike, NTTP of Hip, Knee, Ankle, Foot; NT with AROM/PROM of Hip, Knee, Ankle, Foot; Q/H/Gsc/TA/EHL/FHL intact    Imaging: No obvious fracture detected on films.    A/P: 37M who presents s/p mechanical fall with increasing severe right lower leg pain    Analgesia  WBAT  PT/OT  Ice and elevate as tolerated  Will discuss with Dr. Hartmann and will advise changes to the plan as necessary

## 2023-10-01 NOTE — ED PROVIDER NOTE - CLINICAL SUMMARY MEDICAL DECISION MAKING FREE TEXT BOX
DDx: r/o fx, compartment syndrome, no neurological deficits at this time  Plan: pain control, x-rays, ortho consult, and reassess.

## 2023-10-01 NOTE — ED ADULT NURSE NOTE - OBJECTIVE STATEMENT
Pt is A&O x 3, states he fell two days ago slipping on the stairs outside of his house and landed on his right shin on a . Linear vertical laceration present on right shin with swelling on the right shin and severe pain. 20 G IV inserted into left A/C.

## 2023-10-01 NOTE — CHART NOTE - NSCHARTNOTEFT_GEN_A_CORE
Patient seen and examined at bedside. In the interval since last seen, patient received second dose of 4mg IV morphine. Patient resting comfortably and sleeping when seen at bedside. After awaking patient, patient states pain has not grown worse.    LLE  Spontaneously moving ankle and toes without significant discomfort. No pain with passive stretch. SILT L4-S1  palpable superficial hematoma. Patient seen and examined at bedside. In the interval since last seen, patient received second dose of 4mg IV morphine. Patient resting comfortably and sleeping when seen at bedside. After awaking patient, patient states pain has not grown worse.    LLE  Spontaneously moving ankle and toes without significant discomfort. No pain with passive stretch. SILT L4-S1  palpable superficial hematoma.    -Please hold opioid pain meds.   -Signs/Symptoms of compartment syndrome re-reviewed with patient. Patient demonstrated good understanding. Patient understands and will alert orthopaedics immediately if experiencing signs/symptoms. All questions answered.

## 2023-10-01 NOTE — ED ADULT TRIAGE NOTE - CHIEF COMPLAINT QUOTE
Pt presents to ER c/o right leg pain. Pt reports he slipped in his driveway 2 days PTA and fell onto a melony from standing. Pt went to  this morning and was sent to ER. Pt having difficulty ambulating

## 2023-10-01 NOTE — ED PROVIDER NOTE - PATIENT PORTAL LINK FT
You can access the FollowMyHealth Patient Portal offered by [facility] by registering at the following website: [email or web address]. By joining [employer]’s FollowMyHealth portal, you will also be able to view your health information using other applications (apps) compatible with our system.

## 2023-10-01 NOTE — CHART NOTE - NSCHARTNOTEFT_GEN_A_CORE
Patient seen and examined at bedside.  Patient resting comfortably  at bedside. After awaking patient, patient states pain has not gotten worse.    LLE  Spontaneously moving ankle and toes without significant discomfort. No pain with passive stretch. SILT L4-S1  Palpable superficial hematoma.    -Please hold opioid pain meds.   -Signs/Symptoms of compartment syndrome re-reviewed with patient. Patient demonstrated good understanding. Patient understands and will alert orthopaedics immediately if experiencing signs/symptoms. All questions answered. Patient seen and examined at bedside.  Patient resting comfortably at bedside. Patient states pain has not gotten worse.    LLE  Spontaneously moving ankle and toes without significant discomfort. No pain with passive stretch. SILT L4-S1  Palpable superficial hematoma.    -Please hold opioid pain meds.   -Signs/Symptoms of compartment syndrome re-reviewed with patient. Patient demonstrated good understanding. Patient understands and will alert orthopaedics immediately if experiencing signs/symptoms. All questions answered.

## 2023-10-01 NOTE — CONSULT NOTE ADULT - SUBJECTIVE AND OBJECTIVE BOX
HPI:  36yo M w/ GERD presents s/p fall with right lower leg pain. Patient tripped down stairs and fell onto a  on his right shin. Denies head trauma, LOC or anticoag/anthrombotic medications. Fall happened 2 days ago, initially was trying to shake it off, ambulating but over time, pain has increased significantly and was not able to ambulate today. Patient has full sensation and motor function at the foot/ankle. Denies fever/chills, shortness of breath, chest pain. VS reviewed      PAST MEDICAL & SURGICAL HISTORY:  GERD (gastroesophageal reflux disease)      No significant past surgical history          MEDICATIONS  (STANDING):    MEDICATIONS  (PRN):      Allergies    No Known Allergies    Intolerances        SOCIAL HISTORY:    FAMILY HISTORY:  FH: type 2 diabetes (Father)            Physical Exam:  General: NAD, resting comfortably  HEENT: NC/AT, EOMI, normal hearing, no oral lesions, no LAD, neck supple  Pulmonary: normal resp effort, CTA-B  Cardiovascular: NSR, no murmurs  Abdominal: soft, ND/NT, no organomegaly  Extremities: right lower leg swelling and medial ecchymosis, severe tenderness to palpation. compartment is soft. foot/ankle motor and sensation intact, pulses palpable. warm to touch  Neuro: A/O x 3, CNs II-XII grossly intact, normal sensation, no focal deficits  Pulses: palpable distal pulses    Vital Signs Last 24 Hrs  T(C): 38.1 (01 Oct 2023 16:05), Max: 38.1 (01 Oct 2023 16:05)  T(F): 100.5 (01 Oct 2023 16:05), Max: 100.5 (01 Oct 2023 16:05)  HR: 74 (01 Oct 2023 16:05) (74 - 106)  BP: 101/67 (01 Oct 2023 16:05) (101/67 - 107/87)  BP(mean): 78 (01 Oct 2023 16:05) (78 - 78)  RR: 14 (01 Oct 2023 16:05) (14 - 18)  SpO2: 98% (01 Oct 2023 16:05) (98% - 99%)    Parameters below as of 01 Oct 2023 16:05  Patient On (Oxygen Delivery Method): room air        I&O's Summary          LABS:                        13.8   14.72 )-----------( 392      ( 01 Oct 2023 12:40 )             39.0     10-01    135  |  103  |  13  ----------------------------<  105<H>  4.0   |  29  |  1.04    Ca    9.3      01 Oct 2023 12:40    TPro  7.4  /  Alb  3.8  /  TBili  1.3<H>  /  DBili  x   /  AST  27  /  ALT  35  /  AlkPhos  105  10-01    PT/INR - ( 01 Oct 2023 12:40 )   PT: 11.8 sec;   INR: 1.05 ratio         PTT - ( 01 Oct 2023 12:40 )  PTT:32.3 sec  Urinalysis Basic - ( 01 Oct 2023 12:40 )    Color: x / Appearance: x / SG: x / pH: x  Gluc: 105 mg/dL / Ketone: x  / Bili: x / Urobili: x   Blood: x / Protein: x / Nitrite: x   Leuk Esterase: x / RBC: x / WBC x   Sq Epi: x / Non Sq Epi: x / Bacteria: x      CAPILLARY BLOOD GLUCOSE        LIVER FUNCTIONS - ( 01 Oct 2023 12:40 )  Alb: 3.8 g/dL / Pro: 7.4 gm/dL / ALK PHOS: 105 U/L / ALT: 35 U/L / AST: 27 U/L / GGT: x             Cultures:      RADIOLOGY & ADDITIONAL STUDIES:      Plan:

## 2023-10-01 NOTE — CONSULT NOTE ADULT - ASSESSMENT
36yo M presents s/p fall with right lower leg pain  trauma surgery consulted for possible NSTI and compartment syndrome    clinically, no signs of NSTI as there is no palpable crepitus, no gas seen on xray, labs unremarkable  clinically no signs of compartment syndrome as motor/sensation intact, soft compartment. CK elevated but mildly    Recommendation  CT of right lower extremity with contrast  tetatnus shot  if no signs of compartment or NSTI on CT, no surgical intervention. patient may have cellulitis from the shin wound, can be dc with keflex if CT normal  trauma surgery will follow up with CT    Plan discussed with trauma surgery attending, Dr. Ochoa

## 2023-10-01 NOTE — ED PROVIDER NOTE - OBJECTIVE STATEMENT
36 yo male with PMHx GERD presents to the ED c/o right leg pain. Pt reports he slipped and fell in his driveway 2 days ago. Pt went to  this morning and was referred to ED for further evaluation. Pt states the pain was not as bad last night, but now the pain has increased. Pt was able to ambulate yesterday, but is having difficulty ambulating due to pain today. Pt not endorsing any right sided knee or ankle pain.

## 2023-10-01 NOTE — ED PROVIDER NOTE - NSFOLLOWUPINSTRUCTIONS_ED_ALL_ED_FT
Acute Compartment Syndrome    Acute compartment syndrome is a painful condition that occurs when swelling and pressure build up suddenly in a body space (compartment). Groups of muscles, nerves, and blood vessels in the arms and legs are  into various compartments. Each compartment is surrounded by tough layers of tissue called fascia. In compartment syndrome, pressure builds up within the layers of fascia and begins to push on the structures within that compartment. Compartment syndrome can occur in any muscle compartment of the body, but it is most common in the leg (below the knee) and in the arm (below the elbow).    In acute compartment syndrome, the sudden pressure that builds up is often because of an injury. If pressure continues to build, it can block the flow of blood in the smallest blood vessels (capillaries). As a result, the muscles in the compartment cannot get enough oxygen and nutrients and will start to die within 4–6 hours. The nerves will begin to die within 12–24 hours. This condition is a medical emergency, and it must be treated with surgery. If left untreated, this condition could result in permanent damage or loss of the leg or arm.    What are the causes?  This condition may be caused by:  Injury. Some injuries can cause swelling or bleeding in a compartment, and this can lead to compartment syndrome. Injuries that may cause this problem include:  Broken bones, especially the long bones of your arms and legs.  Crushing injuries.  Badly bruised muscles.  Injuries from something that penetratesthe body, such as a knife that causes a wound.  Severe burns.  Poisonous bites, such as a snake bite.  Blocked blood flow. Causes of blocked blood flow include:  A cast or bandage that is too tight.  A surgical procedure. Blood flow sometimes must be stopped for a while during a surgery, usually with a very tight device (tourniquet).  Lying for too long in a position that restricts blood flow. This can happen if you have nerve damage or if you are unconscious for a long time.  Anabolic steroids. These medicines are used to build up muscles.  Blood thinners. These medicines keep your blood from forming clots but can increase internal bleeding after an injury.  What are the signs or symptoms?  The main symptom of this condition is pain.  Pain may be far more severe than it should be for the injury you have.  Pain may get worse:  When moving or stretching the affected leg or arm.  When the area is pushed or squeezed.  When you raise (elevate) the affected area above the level of your heart.  Pain may come with a feeling of tingling or burning.  Pain may not get better when you take pain medicine.  Other symptoms include:  A feeling of tightness or fullness in the affected area.  Numbness.  Weakness in the affected area.  Loss of movement.  Skin becoming pale, tight, and shiny over the painful area.  Warmth and tenderness.  Tensing when the affected area is touched.  How is this diagnosed?  This condition may be diagnosed based on:  Your history and symptoms.  A physical exam.  Measuring the pressure in the affected area (compartment pressure measurement).  You may also have other tests, including:  X-rays.  Ultrasound.  How is this treated?  This condition is treated with a procedure called fasciotomy. This is an urgent procedure that is done as soon as possible to prevent muscle death. In this procedure, incisions are made through the fascia to relieve the pressure in the compartment and to restore blood flow to that area. This procedure helps to prevent permanent damage to the body part. Before the surgery, first-aid treatment is done, which may include:  Treating any injury.  Loosening or removing any splint, cast, bandage, or external wrap that may be causing pain.  Elevating the painful arm or leg to the same level as your heart.  Giving oxygen.  Giving fluids through an IV.  Giving pain medicine.  Summary  Compartment syndrome occurs when swelling and pressure build up in a body space (compartment). Compartment syndrome can occur in any muscle compartment of the body but is most common in the leg (below the knee) and in the arm (below the elbow).  In acute compartment syndrome, the pressure builds up suddenly, often because of an injury. This condition is a medical emergency.  Symptoms of acute compartment syndrome may include one or more of the following in the affected body part: severe pain, tingling or burning, weakness, loss of movement, or paleness.  First-aid treatment may include loosening or removing a splint, cast, bandage, or wrap. It may also include elevating your painful arm or leg to the same level as your heart.  Compartment syndrome must be treated with a surgical procedure called fasciotomy. This procedure relieves pressure, restores blood flow, and prevents permanent damage.  This information is not intended to replace advice given to you by your health care provider. Make sure you discuss any questions you have with your health care provider.

## 2023-10-01 NOTE — ED ADULT NURSE NOTE - NSFALLHARMRISKINTERV_ED_ALL_ED
Assistance OOB with selected safe patient handling equipment if applicable/Communicate risk of Fall with Harm to all staff, patient, and family/Provide visual cue: red socks, yellow wristband, yellow gown, etc/Reinforce activity limits and safety measures with patient and family/Bed in lowest position, wheels locked, appropriate side rails in place/Call bell, personal items and telephone in reach/Instruct patient to call for assistance before getting out of bed/chair/stretcher/Non-slip footwear applied when patient is off stretcher/[place] to call system/Physically safe environment - no spills, clutter or unnecessary equipment/Purposeful Proactive Rounding/Room/bathroom lighting operational, light cord in reach

## 2023-10-01 NOTE — ED ADULT NURSE NOTE - NS ED NOTE ABUSE RESPONSE YN
Patient needs a new script for the Cyclobenzaprine 10 mg tabs sent to [facility] pharmacy. Patient is completely out. SPM out of the office, could someone else refill.   Yes

## 2023-10-01 NOTE — CONSULT NOTE ADULT - ATTENDING COMMENTS
Pt seen and examined.  Spontaneously moving ankle and toes without significant discomfort. No pain with passive stretch. Sensations intact.  palpable superficial hematoma.    Xrays negative for fx    No evidence of comp syndrome. Pt had 4mg morphine 3 hours ago.  Hold pain meds. No indication for fasciotomy or monitoring comp pressure given soft compartments and active motion.

## 2023-10-01 NOTE — ED PROVIDER NOTE - CARE PROVIDER_API CALL
Juancho Hartmann  Orthopaedic Surgery  [address] [place] [address], [address]  [place] [address]  Phone: [phone]  Fax: [phone]  Follow Up Time: 4-6 Days

## 2023-10-06 LAB
CULTURE RESULTS: SIGNIFICANT CHANGE UP
CULTURE RESULTS: SIGNIFICANT CHANGE UP
SPECIMEN SOURCE: SIGNIFICANT CHANGE UP
SPECIMEN SOURCE: SIGNIFICANT CHANGE UP

## 2023-11-03 ENCOUNTER — NON-APPOINTMENT (OUTPATIENT)
Age: 38
End: 2023-11-03

## 2023-12-25 ENCOUNTER — NON-APPOINTMENT (OUTPATIENT)
Age: 38
End: 2023-12-25

## 2024-02-06 NOTE — H&P ADULT - NSTOBACCOSCREENHP_GEN_A_CS
Local Anesthesia Pre Procedure Assessment    Informed Consent:    Consent Obtained: Yes       Procedure Assessment:    Preop Diagnosis/Indications for Procedure: lumbar spondylosis without myelopathy  Planned Procedure: right L4, L5 medial branch, dorsal ramus of L6 block #1  Planned Anesthetic: Local    Medical History/Comorbid Conditions:    Significant Medical/Surgical History: Yes (comment)  Normal Mental Status: Yes    Examination Pertinent to Procedure Being Performed:    Evaluation of Operative Site: site of procedure - clean, dry, intact    Other Findings:    Reviewed Current Medications and Allergies: Yes    Pertinent Lab/Diagnostic Tests:    Pertinent Lab / Diagnostic Tests: None      Lewis Sánchez MD  2/6/2024     No

## 2024-04-06 ENCOUNTER — NON-APPOINTMENT (OUTPATIENT)
Age: 39
End: 2024-04-06

## 2024-04-07 ENCOUNTER — EMERGENCY (EMERGENCY)
Facility: HOSPITAL | Age: 39
LOS: 0 days | Discharge: HOME | End: 2024-04-07
Attending: EMERGENCY MEDICINE
Payer: MEDICAID

## 2024-04-07 VITALS
DIASTOLIC BLOOD PRESSURE: 76 MMHG | RESPIRATION RATE: 18 BRPM | TEMPERATURE: 99 F | HEART RATE: 80 BPM | SYSTOLIC BLOOD PRESSURE: 121 MMHG | OXYGEN SATURATION: 100 %

## 2024-04-07 VITALS
WEIGHT: 259.93 LBS | RESPIRATION RATE: 20 BRPM | OXYGEN SATURATION: 96 % | TEMPERATURE: 99 F | DIASTOLIC BLOOD PRESSURE: 87 MMHG | SYSTOLIC BLOOD PRESSURE: 147 MMHG | HEART RATE: 100 BPM | HEIGHT: 75 IN

## 2024-04-07 DIAGNOSIS — K21.9 GASTRO-ESOPHAGEAL REFLUX DISEASE WITHOUT ESOPHAGITIS: ICD-10-CM

## 2024-04-07 DIAGNOSIS — J18.9 PNEUMONIA, UNSPECIFIED ORGANISM: ICD-10-CM

## 2024-04-07 DIAGNOSIS — L40.9 PSORIASIS, UNSPECIFIED: ICD-10-CM

## 2024-04-07 DIAGNOSIS — R06.02 SHORTNESS OF BREATH: ICD-10-CM

## 2024-04-07 DIAGNOSIS — R91.1 SOLITARY PULMONARY NODULE: ICD-10-CM

## 2024-04-07 DIAGNOSIS — R07.9 CHEST PAIN, UNSPECIFIED: ICD-10-CM

## 2024-04-07 DIAGNOSIS — D72.829 ELEVATED WHITE BLOOD CELL COUNT, UNSPECIFIED: ICD-10-CM

## 2024-04-07 DIAGNOSIS — Z20.822 CONTACT WITH AND (SUSPECTED) EXPOSURE TO COVID-19: ICD-10-CM

## 2024-04-07 DIAGNOSIS — K44.9 DIAPHRAGMATIC HERNIA WITHOUT OBSTRUCTION OR GANGRENE: ICD-10-CM

## 2024-04-07 DIAGNOSIS — F17.200 NICOTINE DEPENDENCE, UNSPECIFIED, UNCOMPLICATED: ICD-10-CM

## 2024-04-07 DIAGNOSIS — R05.9 COUGH, UNSPECIFIED: ICD-10-CM

## 2024-04-07 LAB
ALBUMIN SERPL ELPH-MCNC: 3.8 G/DL — SIGNIFICANT CHANGE UP (ref 3.3–5)
ALP SERPL-CCNC: 137 U/L — HIGH (ref 40–120)
ALT FLD-CCNC: 28 U/L — SIGNIFICANT CHANGE UP (ref 12–78)
ANION GAP SERPL CALC-SCNC: 3 MMOL/L — LOW (ref 5–17)
APTT BLD: 33.4 SEC — SIGNIFICANT CHANGE UP (ref 24.5–35.6)
AST SERPL-CCNC: 18 U/L — SIGNIFICANT CHANGE UP (ref 15–37)
BASE EXCESS BLDV CALC-SCNC: -0.6 MMOL/L — SIGNIFICANT CHANGE UP (ref -2–3)
BASOPHILS # BLD AUTO: 0.07 K/UL — SIGNIFICANT CHANGE UP (ref 0–0.2)
BASOPHILS NFR BLD AUTO: 0.5 % — SIGNIFICANT CHANGE UP (ref 0–2)
BILIRUB SERPL-MCNC: 1 MG/DL — SIGNIFICANT CHANGE UP (ref 0.2–1.2)
BUN SERPL-MCNC: 11 MG/DL — SIGNIFICANT CHANGE UP (ref 7–23)
CALCIUM SERPL-MCNC: 9.1 MG/DL — SIGNIFICANT CHANGE UP (ref 8.5–10.1)
CHLORIDE SERPL-SCNC: 107 MMOL/L — SIGNIFICANT CHANGE UP (ref 96–108)
CO2 SERPL-SCNC: 25 MMOL/L — SIGNIFICANT CHANGE UP (ref 22–31)
CREAT SERPL-MCNC: 1.02 MG/DL — SIGNIFICANT CHANGE UP (ref 0.5–1.3)
D DIMER BLD IA.RAPID-MCNC: <150 NG/ML DDU — SIGNIFICANT CHANGE UP
EGFR: 96 ML/MIN/1.73M2 — SIGNIFICANT CHANGE UP
EOSINOPHIL # BLD AUTO: 0.44 K/UL — SIGNIFICANT CHANGE UP (ref 0–0.5)
EOSINOPHIL NFR BLD AUTO: 3.4 % — SIGNIFICANT CHANGE UP (ref 0–6)
FLUAV AG NPH QL: SIGNIFICANT CHANGE UP
FLUBV AG NPH QL: SIGNIFICANT CHANGE UP
GLUCOSE SERPL-MCNC: 93 MG/DL — SIGNIFICANT CHANGE UP (ref 70–99)
HCO3 BLDV-SCNC: 25 MMOL/L — SIGNIFICANT CHANGE UP (ref 22–29)
HCT VFR BLD CALC: 43.5 % — SIGNIFICANT CHANGE UP (ref 39–50)
HGB BLD-MCNC: 14.9 G/DL — SIGNIFICANT CHANGE UP (ref 13–17)
IMM GRANULOCYTES NFR BLD AUTO: 0.4 % — SIGNIFICANT CHANGE UP (ref 0–0.9)
INR BLD: 0.94 RATIO — SIGNIFICANT CHANGE UP (ref 0.85–1.18)
LACTATE SERPL-SCNC: 1.2 MMOL/L — SIGNIFICANT CHANGE UP (ref 0.7–2)
LYMPHOCYTES # BLD AUTO: 17.4 % — SIGNIFICANT CHANGE UP (ref 13–44)
LYMPHOCYTES # BLD AUTO: 2.25 K/UL — SIGNIFICANT CHANGE UP (ref 1–3.3)
MCHC RBC-ENTMCNC: 31 PG — SIGNIFICANT CHANGE UP (ref 27–34)
MCHC RBC-ENTMCNC: 34.3 GM/DL — SIGNIFICANT CHANGE UP (ref 32–36)
MCV RBC AUTO: 90.4 FL — SIGNIFICANT CHANGE UP (ref 80–100)
MONOCYTES # BLD AUTO: 0.84 K/UL — SIGNIFICANT CHANGE UP (ref 0–0.9)
MONOCYTES NFR BLD AUTO: 6.5 % — SIGNIFICANT CHANGE UP (ref 2–14)
NEUTROPHILS # BLD AUTO: 9.27 K/UL — HIGH (ref 1.8–7.4)
NEUTROPHILS NFR BLD AUTO: 71.8 % — SIGNIFICANT CHANGE UP (ref 43–77)
PCO2 BLDV: 46 MMHG — SIGNIFICANT CHANGE UP (ref 42–55)
PH BLDV: 7.35 — SIGNIFICANT CHANGE UP (ref 7.32–7.43)
PLATELET # BLD AUTO: 305 K/UL — SIGNIFICANT CHANGE UP (ref 150–400)
PO2 BLDV: 37 MMHG — SIGNIFICANT CHANGE UP (ref 25–45)
POTASSIUM SERPL-MCNC: 4.6 MMOL/L — SIGNIFICANT CHANGE UP (ref 3.5–5.3)
POTASSIUM SERPL-SCNC: 4.6 MMOL/L — SIGNIFICANT CHANGE UP (ref 3.5–5.3)
PROT SERPL-MCNC: 7.8 GM/DL — SIGNIFICANT CHANGE UP (ref 6–8.3)
PROTHROM AB SERPL-ACNC: 10.6 SEC — SIGNIFICANT CHANGE UP (ref 9.5–13)
RBC # BLD: 4.81 M/UL — SIGNIFICANT CHANGE UP (ref 4.2–5.8)
RBC # FLD: 12.3 % — SIGNIFICANT CHANGE UP (ref 10.3–14.5)
RSV RNA NPH QL NAA+NON-PROBE: SIGNIFICANT CHANGE UP
SAO2 % BLDV: 68 % — SIGNIFICANT CHANGE UP (ref 67–88)
SARS-COV-2 RNA SPEC QL NAA+PROBE: SIGNIFICANT CHANGE UP
SODIUM SERPL-SCNC: 135 MMOL/L — SIGNIFICANT CHANGE UP (ref 135–145)
TROPONIN I, HIGH SENSITIVITY RESULT: 3.14 NG/L — SIGNIFICANT CHANGE UP
WBC # BLD: 12.92 K/UL — HIGH (ref 3.8–10.5)
WBC # FLD AUTO: 12.92 K/UL — HIGH (ref 3.8–10.5)

## 2024-04-07 PROCEDURE — 85730 THROMBOPLASTIN TIME PARTIAL: CPT

## 2024-04-07 PROCEDURE — 71045 X-RAY EXAM CHEST 1 VIEW: CPT | Mod: 26

## 2024-04-07 PROCEDURE — 0241U: CPT

## 2024-04-07 PROCEDURE — 71250 CT THORAX DX C-: CPT | Mod: 26,MC

## 2024-04-07 PROCEDURE — 87040 BLOOD CULTURE FOR BACTERIA: CPT

## 2024-04-07 PROCEDURE — 36415 COLL VENOUS BLD VENIPUNCTURE: CPT

## 2024-04-07 PROCEDURE — 85025 COMPLETE CBC W/AUTO DIFF WBC: CPT

## 2024-04-07 PROCEDURE — 82803 BLOOD GASES ANY COMBINATION: CPT

## 2024-04-07 PROCEDURE — 99285 EMERGENCY DEPT VISIT HI MDM: CPT | Mod: 25

## 2024-04-07 PROCEDURE — 83605 ASSAY OF LACTIC ACID: CPT

## 2024-04-07 PROCEDURE — 85610 PROTHROMBIN TIME: CPT

## 2024-04-07 PROCEDURE — 85379 FIBRIN DEGRADATION QUANT: CPT

## 2024-04-07 PROCEDURE — 93005 ELECTROCARDIOGRAM TRACING: CPT

## 2024-04-07 PROCEDURE — 93010 ELECTROCARDIOGRAM REPORT: CPT

## 2024-04-07 PROCEDURE — 99285 EMERGENCY DEPT VISIT HI MDM: CPT

## 2024-04-07 PROCEDURE — 94640 AIRWAY INHALATION TREATMENT: CPT

## 2024-04-07 PROCEDURE — 71045 X-RAY EXAM CHEST 1 VIEW: CPT

## 2024-04-07 PROCEDURE — 71250 CT THORAX DX C-: CPT | Mod: MC

## 2024-04-07 PROCEDURE — 96375 TX/PRO/DX INJ NEW DRUG ADDON: CPT

## 2024-04-07 PROCEDURE — 96374 THER/PROPH/DIAG INJ IV PUSH: CPT

## 2024-04-07 PROCEDURE — 80053 COMPREHEN METABOLIC PANEL: CPT

## 2024-04-07 PROCEDURE — 84484 ASSAY OF TROPONIN QUANT: CPT

## 2024-04-07 RX ORDER — AZITHROMYCIN 500 MG/1
500 TABLET, FILM COATED ORAL ONCE
Refills: 0 | Status: COMPLETED | OUTPATIENT
Start: 2024-04-07 | End: 2024-04-07

## 2024-04-07 RX ORDER — IPRATROPIUM/ALBUTEROL SULFATE 18-103MCG
3 AEROSOL WITH ADAPTER (GRAM) INHALATION ONCE
Refills: 0 | Status: COMPLETED | OUTPATIENT
Start: 2024-04-07 | End: 2024-04-07

## 2024-04-07 RX ORDER — ALBUTEROL 90 UG/1
2 AEROSOL, METERED ORAL
Qty: 3.7 | Refills: 0
Start: 2024-04-07 | End: 2024-04-13

## 2024-04-07 RX ORDER — CEFTRIAXONE 500 MG/1
1000 INJECTION, POWDER, FOR SOLUTION INTRAMUSCULAR; INTRAVENOUS ONCE
Refills: 0 | Status: COMPLETED | OUTPATIENT
Start: 2024-04-07 | End: 2024-04-07

## 2024-04-07 RX ADMIN — AZITHROMYCIN 255 MILLIGRAM(S): 500 TABLET, FILM COATED ORAL at 16:53

## 2024-04-07 RX ADMIN — CEFTRIAXONE 1000 MILLIGRAM(S): 500 INJECTION, POWDER, FOR SOLUTION INTRAMUSCULAR; INTRAVENOUS at 16:53

## 2024-04-07 RX ADMIN — Medication 3 MILLILITER(S): at 14:48

## 2024-04-07 NOTE — ED PROVIDER NOTE - OBJECTIVE STATEMENT
38-year-old male who with a history of GERD, hiatal hernia, psoriasis, who presents with chief complaint of shortness of breath, cough.  Patient states that symptoms have been ongoing over the last month.  States he has been coughing up green sputum, and feels short of breath with minimal exertion.  Complains of some sharp achy chest pain as well.  He denies any fever or chills, lower extremity edema, leg pain, or any other symptoms.  States he is a half a pack a day smoker.  Denies sick contacts.  Patient is on Stelara for his psoriasis.

## 2024-04-07 NOTE — ED ADULT NURSE NOTE - OBJECTIVE STATEMENT
pt presents to the ED c/o SOB, cough, general malaise and congestion x about a month. denies sick contacts or fevers. reports recently starting to smoke tobacco again after quitting for 7 years. denies respiratory or cardiac hx. pt A&Ox4, well appearing, and ambulatory at baseline with no further complaints or discomforts reported at this time.

## 2024-04-07 NOTE — ED ADULT NURSE NOTE - NSFALLUNIVINTERV_ED_ALL_ED
Bed/Stretcher in lowest position, wheels locked, appropriate side rails in place/Call bell, personal items and telephone in reach/Instruct patient to call for assistance before getting out of bed/chair/stretcher/Non-slip footwear applied when patient is off stretcher/[place] to call system/Physically safe environment - no spills, clutter or unnecessary equipment/Purposeful proactive rounding/Room/bathroom lighting operational, light cord in reach

## 2024-04-07 NOTE — ED PROVIDER NOTE - PATIENT PORTAL LINK FT
You can access the FollowMyHealth Patient Portal offered by [facility] by registering at the following website: [email or web address]. By joining [employer]’s FollowMyHealth portal, you will also be able to view your health information using other applications (apps) compatible with our system.

## 2024-04-07 NOTE — ED PROVIDER NOTE - NSFOLLOWUPINSTRUCTIONS_ED_ALL_ED_FT
Community-Acquired Pneumonia, Adult  Pneumonia is a lung infection that causes inflammation and the buildup of mucus and fluids in the lungs. This may cause coughing and difficulty breathing. Community-acquired pneumonia is pneumonia that develops in people who are not, and have not recently been, in a hospital or other health care facility.    Usually, pneumonia develops as a result of an illness that is caused by a virus, such as the common cold and the flu (influenza). It can also be caused by bacteria or fungi. While the common cold and influenza can pass from person to person (are contagious), pneumonia itself is not considered contagious.    What are the causes?  The human body, showing how a virus travels from the air to a person's lungs.   This condition may be caused by:  Viruses.  Bacteria.  Fungi.  What increases the risk?  The following factors may make you more likely to develop this condition:  Being over age 65 or having certain medical conditions, such as:  A long-term (chronic) disease, such as: chronic obstructive pulmonary disease (COPD), asthma, heart failure, diabetes, or kidney disease.  A condition that increases the risk of breathing in (aspirating) mucus and other fluids from your mouth and nose.  A weakened body defense system (immune system).  Having had your spleen removed (splenectomy). The spleen is the organ that helps fight germs and infections.  Not cleaning your teeth and gums well (poor dental hygiene).  Using tobacco products.  Traveling to places where germs that cause pneumonia are present or being near certain animals or animal habitats that could have germs that cause pneumonia.  What are the signs or symptoms?  Symptoms of this condition include:  A dry cough or a wet (productive) cough.  A fever, sweating, or chills.  Chest pain, especially when breathing deeply or coughing.  Fast breathing, difficulty breathing, or shortness of breath.  Tiredness (fatigue) and muscle aches.  How is this diagnosed?  An outline of a person's body and an image of an X-ray of the person's chest.   This condition may be diagnosed based on your medical history or a physical exam. You may also have tests, including:  Imaging, such as a chest X-ray or lung ultrasound.  Tests of:  The level of oxygen and other gases in your blood.  Mucus from your lungs (sputum).  Fluid around your lungs (pleural fluid).  Your urine.  How is this treated?  Treatment for this condition depends on many factors, such as the cause of your pneumonia, your medicines, and other medical conditions that you have.    For most adults, pneumonia may be treated at home. In some cases, treatment must happen in a hospital and may include:  Medicines that are given by mouth (orally) or through an IV, including:  Antibiotic medicines, if bacteria caused the pneumonia.  Medicines that kill viruses (antiviral medicines), if a virus caused the pneumonia.  Oxygen therapy.  Severe pneumonia, although rare, may require the following treatments:  Mechanical ventilation.This procedure uses a machine to help you breathe if you cannot breathe well on your own or maintain a safe level of blood oxygen.  Thoracentesis. This procedure removes any buildup of pleural fluid to help with breathing.  Follow these instructions at home:  A comparison of three sample cups showing dark yellow, yellow, and pale yellow urine.  Medicines    Take over-the-counter and prescription medicines only as told by your health care provider.  Take cough medicine only if you have trouble sleeping. Cough medicine can prevent your body from removing mucus from your lungs.  If you were prescribed antibiotics, take them as told by your health care provider. Do not stop taking the antibiotic even if you start to feel better.  Lifestyle    A sign showing that a person should not drink alcohol.  A sign showing that a person should not smoke.  Do not drink alcohol.  Do not use any products that contain nicotine or tobacco. These products include cigarettes, chewing tobacco, and vaping devices, such as e-cigarettes. If you need help quitting, ask your health care provider.  Eat a healthy diet. This includes plenty of vegetables, fruits, whole grains, low-fat dairy products, and lean protein.  General instructions    Rest a lot and get at least 8 hours of sleep each night.  Sleep in a partly upright position at night. Place a few pillows under your head or sleep in a reclining chair.  Return to your normal activities as told by your health care provider. Ask your health care provider what activities are safe for you.  Drink enough fluid to keep your urine pale yellow. This helps to thin the mucus in your lungs.  If your throat is sore, gargle with a mixture of salt and water 3–4 times a day or as needed. To make salt water, completely dissolve ½–1 tsp (3–6 g) of salt in 1 cup (237 mL) of warm water.  Keep all follow-up visits.  How is this prevented?  You can lower your risk of developing community-acquired pneumonia by:  Getting the pneumonia vaccine. There are different types and schedules of pneumonia vaccines. Ask your health care provider which option is best for you. Consider getting the pneumonia vaccine if:  You are older than 65 years of age.  You are 19–65 years of age and are receiving cancer treatment, have chronic lung disease, or have other medical conditions that affect your immune system. Ask your health care provider if this applies to you.  Getting your influenza vaccine every year. Ask your health care provider which type of vaccine is best for you.  Getting regular dental checkups.  Washing your hands often with soap and water for at least 20 seconds. If soap and water are not available, use hand .  Contact a health care provider if:  You have a fever.  You have trouble sleeping because you cannot control your cough with cough medicine.  Get help right away if:  Your shortness of breath becomes worse.  Your chest pain increases.  Your sickness becomes worse, especially if you are an older adult or have a weak immune system.  You cough up blood.  These symptoms may be an emergency. Get help right away. Call 911.  Do not wait to see if the symptoms will go away.  Do not drive yourself to the hospital.  Summary  Pneumonia is an infection of the lungs.  Community-acquired pneumonia develops in people who have not been in the hospital. It can be caused by bacteria, viruses, or fungi.  This condition may be treated with antibiotics or antiviral medicines.  Severe pneumonia may require a hospital stay and treatment to help with breathing.  This information is not intended to replace advice given to you by your health care provider. Make sure you discuss any questions you have with your health care provider.    Document Revised: 02/15/2023 Document Reviewed: 02/15/2023  [employer] Patient Education © 2024 [employer]  [employer] logo  Terms and Conditions  Privacy Policy  Editorial Policy  All content on this site: Copyright © 2024 Elsevier, its licensors, and contributors. All rights are reserved, including those for text and data mining, AI training, and similar technologies. For all open access content, the Creative Commons licensing terms apply.  Cookies are used by this site. To decline or learn more, visit our Cookies page.

## 2024-04-07 NOTE — ED PROVIDER NOTE - CLINICAL SUMMARY MEDICAL DECISION MAKING FREE TEXT BOX
In summary this is a 38-year-old male who presents with chief complaint of cough and shortness of breath for 1 month.  Patient with mild hypoxia on arrival to 91% on room air, improved to 97% on 2 L nasal cannula.  Labs demonstrate mild leukocytosis to 12.92.  Rest of CBC CMP lactate and troponin were within normal limits.  Blood gas was within normal limits.  Chest x-ray was performed and within normal limits on my independent interpretation.  CT scan of the chest was performed which demonstrated centrilobular micronodules predominantly in the right lower lobe likely infectious in origin.  D-dimer was negative unlikely PE.  Diagnosis of community-acquired pneumonia.  Patient is on Stelara, which makes him more prone to infection.  Patient's port score is 38, low risk.  Patient received DuoNeb, azithromycin, Rocephin in the department.  Patient ambulated in department without oxygen with maintenance of his oxygen saturations to 95-96%.  Patient not short of breath or rest breathlessness on ambulatory trial.  Given patient is technically immunocompromise, I offered admission to the hospital, however patient declines at this time states he would like to go home on oral antibiotic trial, and would return to the ED if he has any worsening.  Antibiotic sent to pharmacy.  Also prescribing an inhaler as patient has a history of chronic cigarette smoking.  Recommend close follow-up with PCP.  Strict turn precaution given for any worsening.  Patient verbalized understanding agrees plan this time.

## 2024-04-07 NOTE — ED PROVIDER NOTE - WR ORDER STATUS 1
"  Wednesday, July 5th, 2017    Study: "A Phase 3, randomized, multicenter, double-blind, placebo-controlled, 2-arm, efficacy and safety study of [id number] plus standard of care versus placebo plus standard of care in subjects with light chain (AL) amyloidosis"  Protocol ID# [id number]  IRB# 2015.305.A  Sponsor: [employer]  Investigator: Dr. Carmichael    Month 6, Day -1  TrialSlate + 6MWT + Labs    Patient presents to perform [employer]ate questionnaires, 6MWT, labs, and ECHO prior to M6, D1 visit.  Patient is ambulatory and states he is doing "a lot better" recently in the way of lightheadedness and shortness of breath.  He states he continues to experience tension and restlessness at nighttime and gets "very little sleep" at night.  See tomorrow, M6, D1 note for full AE assessment.  Patient verbalizes continued willingess to participate in above-mentioned trial.    Patient performed [employer]ate Questionnaires prior to study-related procedures being performed.  Patient then had blood work drawn, weight measured, and urine sample collected.    Patient then performed 6MWT per [facility] study manual.  Course prepared per lab manual.  Patient rested for 10 minutes prior to walk.  Pre-walk vitals: /70, HR 86, RR 18, T 97.6.  Patient then walked course per Trial[employer] instructions.  Patient walked 9 laps + add'l 49 feet for a total of 1489 ft.  Patient had no difficulty performing walk, no SOB or lightheadedness experienced before, during, or after walk.  Post-walk vitals: /87, HR 96, RR 21.      Patient then reported for 2D ECHO in [facility] hospital.      Patient to report for MD visit to follow up on the above as well as to receive month 6, day 1 infusion tomorrow, 7/6.    "
Resulted

## 2024-04-07 NOTE — ED ADULT TRIAGE NOTE - CHIEF COMPLAINT QUOTE
pt presents to ED with complaints of cough, congestion, chest pain, and shortness of breath for a few weeks. pt endorses symptoms have been coming and going. per EMS, pt also had low grade fever today upon their arrival (99.8). pt was also hypoxic to 90% on RA with EMS. placed on 2L NC with EMS and O2 increased to 95%. EKG in progress.

## 2024-04-08 RX ORDER — AZITHROMYCIN 500 MG/1
1 TABLET, FILM COATED ORAL
Qty: 4 | Refills: 0
Start: 2024-04-08 | End: 2024-04-11

## 2024-04-18 ENCOUNTER — NON-APPOINTMENT (OUTPATIENT)
Age: 39
End: 2024-04-18

## 2024-04-25 NOTE — ED ADULT TRIAGE NOTE - IDEAL BODY WEIGHT(KG)
Thank you for enrolling in [employer]. Please follow the instructions below to securely access your online medical record. [employer] allows you to send messages to your doctor, view your test results, renew your prescriptions, schedule appointments, and more.     How Do I Sign Up?  In your Internet browser, go to [email or web address]  Click on the Sign Up Now link in the Sign In box. You will see the New Member Sign Up page.  Enter your [employer] Access Code exactly as it appears below. You will not need to use this code after you’ve completed the sign-up process. If you do not sign up before the expiration date, you must request a new code.  [employer] Access Code: Activation code not generated  Current [employer] Status: Patient Declined    Enter your Social Security Number ([id number]) and Date of Birth (mm/dd/yyyy) as indicated and click Submit. You will be taken to the next sign-up page.  Create a [employer] ID. This will be your [employer] login ID and cannot be changed, so think of one that is secure and easy to remember.  Create a [employer] password. You can change your password at any time.  Enter your Password Reset Question and Answer. This can be used at a later time if you forget your password.   Enter your e-mail address. You will receive e-mail notification when new information is available in [employer].  Click Sign Up. You can now view your medical record.     Additional Information  If you have questions, please contact your physician practice where you receive care. Remember, [employer] is NOT to be used for urgent needs. For medical emergencies, dial 911.     85

## 2024-10-01 NOTE — ED ADULT NURSE NOTE - SUICIDE SCREENING QUESTION 2
[facility]  Department of Neurology  Neurology Consultation Note        PATIENT NAME: Ayla Dela Cruz  MRN: [id number]  CSN: [id number]      TODAY'S DATE: 10/01/2024  ADMIT DATE: 9/30/2024                            CONSULTING PROVIDER: Javier Cormier MD  CONSULT REQUESTED BY: Jyoti Steele MD      Reason for consult: TIA      History obtained from chart review and the patient.    HPI per EMR: Ayla Dela Cruz is a 80 y.o. female presents emergency room for evaluation of left face numbness which onset approximately 45 minutes prior to arrival to the emergency room. She denies any focal weakness, dysarthria, or aphasia. No facial asymmetry. She reports a history of a TIA in the past (on Eliquis). Denies fever or chills. No known sick contacts or travel. No aggravating alleviating factors. Previous medical history includes paroxysmal AFib, pacemaker, hypertension, hyperlipidemia, GERD, interstitial lung disease, diastolic heart failure, iron-deficiency anemia, and obstructive sleep apnea on CPAP. ER workup: CBC and CMP were unremarkable. CTA of the brain was negative for any acute findings. Patient admitted to Hospital Medicine for treatment and management. Patient placed on CVA pathway.     Neurology consult:  Patient was seen and examined by me.  She states that yesterday she had left facial numbness at around 1:00 p.m. for which he presented to the hospital.  She denies any other focal weakness, other sensory changes in her extremities, speech trouble, nausea vomiting or dizziness.  She has a history of stroke/TIA in the past about 10 years ago and has been on Eliquis for paroxysmal AFib and she is compliant with the medication.    CT head was done in the ER showed no acute intracranial abnormality, CT angiogram head and neck showed no large vessel occlusion or high-grade stenosis.    Admitted to the hospital for further stroke workup and management.  Currently her symptoms have improved  and she feels back to her baseline    PREVIOUS MEDICAL HISTORY:  Past Medical History:   Diagnosis Date    Anticoagulant long-term use     Anxiety     Arthritis     Atrial fibrillation     Basal cell carcinoma     Cancer     skin    CHF (congestive heart failure)     Depression     DVT (deep venous thrombosis)     GERD (gastroesophageal reflux disease)     Glaucoma (increased eye pressure)     Hyperlipidemia     diet controlled    Hypertension     Interstitial lung disease     Localized hives 01/10/2020    Mild persistent asthma without complication 11/12/2018    Pacemaker     Pneumonia 01/31/2014    Stroke 06/03/2014    Stroke     TIA (transient ischemic attack)     TIA (transient ischemic attack)      PREVIOUS SURGICAL HISTORY:  Past Surgical History:   Procedure Laterality Date    2 heart ablations      3 in total    A-V CARDIAC PACEMAKER INSERTION Left 10/14/2021    Procedure: INSERTION, CARDIAC PACEMAKER, DUAL CHAMBER;  Surgeon: Fco Calderon MD;  Location: [facility];  Service: Cardiology;  Laterality: Left;  PAF/ Leslie Arrthymias, Dual PPM, SJM, MAC, GP, 3 PREP    ANTERIOR VITRECTOMY Right 7/27/2022    Procedure: VITRECTOMY, ANTERIOR APPROACH;  Surgeon: Daniel Tan MD;  Location: [facility] OR;  Service: Ophthalmology;  Laterality: Right;    bilateral cataracts      CHOLECYSTECTOMY      COLONOSCOPY N/A 1/19/2017    Procedure: COLONOSCOPY and EGD;  Surgeon: Jonathan Schultz MD;  Location: [facility];  Service: Endoscopy;  Laterality: N/A;    COLONOSCOPY N/A 10/10/2019    Procedure: COLONOSCOPY;  Surgeon: Alex Christian MD;  Location: [facility];  Service: Endoscopy;  Laterality: N/A;    ESOPHAGOGASTRODUODENOSCOPY N/A 10/14/2019    Procedure: EGD (ESOPHAGOGASTRODUODENOSCOPY);  Surgeon: Alex Christian MD;  Location: [facility];  Service: Endoscopy;  Laterality: N/A;    ESOPHAGOGASTRODUODENOSCOPY N/A 1/25/2024    Procedure: EGD (ESOPHAGOGASTRODUODENOSCOPY);  Surgeon: Alex Christian MD;  Location: [facility];   Service: Endoscopy;  Laterality: N/A;    INJECTION OF ANESTHETIC AGENT AROUND MEDIAL BRANCH NERVES INNERVATING CERVICAL FACET JOINT Right 6/7/2018    Procedure: BLOCK, NERVE, FACET JOINT, MEDIAL BRANCH, CERVICAL;  Surgeon: Galo Clancy MD;  Location: [facility];  Service: Pain Management;  Laterality: Right;  C4, 5, 6    OPEN REDUCTION AND INTERNAL FIXATION (ORIF) OF INJURY OF ANKLE Right 11/1/2018    Procedure: ORIF, ANKLE;  Surgeon: Wilfredo Aguero MD;  Location: [facility] OR;  Service: Orthopedics;  Laterality: Right;    PARACENTESIS, EYE, ANTERIOR CHAMBER, WITH AQUEOUS REMOVAL Right 7/27/2022    Procedure: Anterior chamber washout, possible IOL removal;  Surgeon: Daniel Tan MD;  Location: [facility] OR;  Service: Ophthalmology;  Laterality: Right;    pyloristenosis      RADIOFREQUENCY ABLATION OF LUMBAR MEDIAL BRANCH NERVE AT SINGLE LEVEL Bilateral 9/21/2018    Procedure: RADIOFREQUENCY ABLATION, NERVE, SPINAL, LUMBAR, MEDIAL BRANCH, 1 LEVEL;  Surgeon: Galo Clancy MD;  Location: [facility];  Service: Pain Management;  Laterality: Bilateral;  L3, 4, 5    RADIOFREQUENCY ABLATION OF LUMBAR MEDIAL BRANCH NERVE AT SINGLE LEVEL Bilateral 2/19/2019    Procedure: Radiofrequency Ablation, Nerve, Spinal, Lumbar, Medial Branch, 1 Level;  Surgeon: Galo Clancy MD;  Location: [facility] OR;  Service: Pain Management;  Laterality: Bilateral;  L3, 4, 5     RADIOFREQUENCY ABLATION OF LUMBAR MEDIAL BRANCH NERVE AT SINGLE LEVEL Bilateral 3/10/2020    Procedure: Radiofrequency Ablation, Nerve, Spinal, Lumbar, Medial Branch, 1 Level;  Surgeon: Galo Clancy MD;  Location: [facility] OR;  Service: Pain Management;  Laterality: Bilateral;  L3,4,5 - Burned at 80 degrees C. for 60 seconds x 2 each site      RADIOFREQUENCY ABLATION OF LUMBAR MEDIAL BRANCH NERVE AT SINGLE LEVEL Bilateral 9/11/2020    Procedure: Radiofrequency Ablation, Nerve, Spinal, Lumbar, Medial Branch, 1 Level;  Surgeon: Galo Clancy MD;  Location: [facility];  Service: Pain Management;   Laterality: Bilateral;  L3, 4, 5 - Burned at 80 degrees C. for 60 seconds x 2 each site    RADIOFREQUENCY ABLATION OF LUMBAR MEDIAL BRANCH NERVE AT SINGLE LEVEL Bilateral 5/28/2021    Procedure: Radiofrequency Ablation, Nerve, Spinal, Lumbar, Medial Branch, 1 Level;  Surgeon: Galo Clancy MD;  Location: [facility] OR;  Service: Pain Management;  Laterality: Bilateral;  L3,4,5    RADIOFREQUENCY THERMAL COAGULATION OF MEDIAL BRANCH OF POSTERIOR RAMUS OF CERVICAL SPINAL NERVE Right 7/3/2018    Procedure: RADIOFREQUENCY THERMAL COAGULATION, NERVE, SPINAL, CERVICAL, MEDIAL BRANCH OF POSTERIOR RAMUS;  Surgeon: Galo Clancy MD;  Location: [facility] OR;  Service: Pain Management;  Laterality: Right;  C4,5,6 - Burned at 80 degrees C. for 75 seconds each site    RADIOFREQUENCY THERMAL COAGULATION OF MEDIAL BRANCH OF POSTERIOR RAMUS OF CERVICAL SPINAL NERVE Right 7/23/2019    Procedure: RADIOFREQUENCY THERMAL COAGULATION, NERVE, SPINAL, CERVICAL, POSTERIOR RAMUS, MEDIAL BRANCH;  Surgeon: Galo Clancy MD;  Location: [facility] OR;  Service: Pain Management;  Laterality: Right;  C4,5,6    RADIOFREQUENCY THERMAL COAGULATION OF MEDIAL BRANCH OF POSTERIOR RAMUS OF CERVICAL SPINAL NERVE Right 6/23/2020    Procedure: RADIOFREQUENCY THERMAL COAGULATION, NERVE, SPINAL, CERVICAL, POSTERIOR RAMUS, MEDIAL BRANCH;  Surgeon: Galo Clanyc MD;  Location: [facility] OR;  Service: Pain Management;  Laterality: Right;  C4, 5, 6    RADIOFREQUENCY THERMOCOAGULATION Bilateral 9/10/2019    Procedure: RADIOFREQUENCY THERMAL COAGULATION LUMBAR;  Surgeon: Galo Clancy MD;  Location: [facility] OR;  Service: Pain Management;  Laterality: Bilateral;  L3,4,5 - Burned at 80 degrees C. for 60 seconds x 2 each site    skin cancer removal       TONSILLECTOMY       FAMILY MEDICAL HISTORY:  Family History   Problem Relation Name Age of Onset    Arthritis Mother      Asthma Mother      Rheum arthritis Mother      Pneumonia Mother      Skin cancer Mother          unsure of type    Heart disease  Father      Ulcers Father      Depression Son      Alzheimer's disease Maternal Uncle      Rheum arthritis Maternal Grandmother      Emphysema Maternal Grandfather      Cancer Maternal Grandfather          kidney    Kidney disease Maternal Grandfather      Cancer Paternal Grandmother          lung= smoker    Pneumonia Paternal Grandfather      Breast cancer Neg Hx      Ovarian cancer Neg Hx       ALLERGIES:  Review of patient's allergies indicates:   Allergen Reactions    Gabapentin Hallucinations    Xarelto [rivaroxaban] Rash    Bactrim [sulfamethoxazole-trimethoprim] Other (See Comments)     Upset stomach, dry heaves, confusion    Amoxicillin-pot clavulanate      Other reaction(s): Mental Status Change    Atorvastatin      Other reaction(s): Joint pain    Baclofen     Baclofen (bulk) Nausea And Vomiting    Ciprofloxacin     Decongest tabs      Other reaction(s): increased heart rate    Decongestant [pseudoephedrine hcl]     Erythromycin Other (See Comments)    Flecainide Hives     And SOB. No reaction to Lidocaine     Fluoxetine      Other reaction(s): heart palpitations  Other reaction(s): anxiety    Lisinopril Other (See Comments)     cough    Losartan Other (See Comments)     Hypotension with lightheadedness    Morphine Other (See Comments)     confusion    Tramadol Other (See Comments)     SOB, low BP    Venlafaxine     Venlafaxine analogues      Changes in BP and increases heart rate       Caffeine Palpitations    Dabigatran etexilate Rash    Plavix [clopidogrel] Rash    Tizanidine Anxiety     dizziness     HOME MEDICATIONS:  Prior to Admission medications    Medication Sig Start Date End Date Taking? Authorizing Provider   apixaban (ELIQUIS) 2.5 mg Tab Take 1 tablet (2.5 mg total) by mouth 2 (two) times daily. 4/25/24  Yes Jan Olivo MD   aspirin 81 MG Chew Take 81 mg by mouth once daily.   Yes Provider, Historical   atropine 1% (ISOPTO ATROPINE) 1 % Drop PLACE 1 DROP INTO THE RIGHT EYE 2 TIMES A  DAY.  Patient taking differently: Place 1 drop into the right eye 2 (two) times a day. 10/27/23  Yes Daniel Tan MD   azelastine (ASTELIN) 137 mcg (0.1 %) nasal spray 1 spray (137 mcg total) by Nasal route 2 (two) times daily. 2/27/24 2/26/25 Yes Jan Olivo MD   busPIRone (BUSPAR) 10 MG tablet Take 1 tablet (10 mg total) by mouth 3 (three) times daily. 7/11/24  Yes Galo Lipscomb, PhD, MP   furosemide (LASIX) 20 MG tablet Take 1 tablet (20 mg total) by mouth daily as needed (Edema). 6/27/24 6/27/25 Yes Lars Bryson MD   latanoprost 0.005 % ophthalmic solution Place 1 drop into both eyes once daily. 5/7/24  Yes Daniel Tan MD   mirtazapine (REMERON) 7.5 MG Tab Take 1 tablet (7.5 mg total) by mouth every evening. 7/11/24 7/11/25 Yes Galo Lipscomb, PhD, MP   pantoprazole (PROTONIX) 40 MG tablet TAKE 1 TABLET (40 MG TOTAL) BY MOUTH ONCE DAILY. 3/19/24 3/19/25 Yes Jan Olivo MD   predniSONE (DELTASONE) 20 MG tablet Take one daily for 3 days and may repeat for shortness of breath.  Patient taking differently: Take 20 mg by mouth As instructed. Take one daily for 3 days and may repeat for shortness of breath. 5/29/24  Yes Lars Bryson MD   propranoloL (INDERAL) 40 MG tablet TAKE 1 TABLET BY MOUTH TWICE A DAY 9/16/24  Yes Barrett Jurado MD   triamterene-hydrochlorothiazide 75-50 mg (MAXZIDE) 75-50 mg per tablet Take 1 tablet by mouth once daily. 6/27/24 6/27/25 Yes Lars Bryson MD   vortioxetine (TRINTELLIX) 20 mg Tab Take 1 tablet (20 mg total) by mouth Daily. 7/11/24  Yes Galo Lipscomb, PhD, MP     CURRENT SCHEDULED MEDICATIONS:   apixaban  2.5 mg Oral BID    aspirin  81 mg Oral Daily    busPIRone  10 mg Oral TID    famotidine  20 mg Oral Daily    latanoprost  1 drop Both Eyes Daily    mirtazapine  7.5 mg Oral QHS    propranoloL  40 mg Oral BID     CURRENT INFUSIONS:    CURRENT PRN MEDICATIONS:    Current Facility-Administered Medications:     bisacodyL, 10 mg, Rectal, Daily PRN     "ondansetron, 4 mg, Intravenous, Q6H PRN    sodium chloride 0.9%, 500 mL, Intravenous, PRN    sodium chloride 0.9%, 10 mL, Intravenous, PRN    REVIEW OF SYSTEMS:  Please refer to the HPI for all pertinent positive and negative findings. A comprehensive review of all other systems was negative.    PHYSICAL EXAM:  Patient Vitals for the past 24 hrs:   BP Temp Temp src Pulse Resp SpO2 Height Weight   10/01/24 0906 -- -- -- 61 16 98 % -- --   10/01/24 0850 130/78 97.8 °F (36.6 °C) -- 63 18 100 % -- --   10/01/24 0550 (!) 123/51 98.2 °F (36.8 °C) Oral 60 18 100 % -- --   10/01/24 0250 135/62 97.7 °F (36.5 °C) Oral 62 20 98 % -- --   09/30/24 2350 (!) 104/54 97.7 °F (36.5 °C) Oral 61 18 99 % -- --   09/30/24 2120 -- -- -- -- -- -- 5' 7" (1.702 m) 49.4 kg (108 lb 14.5 oz)   09/30/24 2051 (!) 154/72 98 °F (36.7 °C) Oral 62 16 100 % -- --   09/30/24 2032 (!) 119/58 -- -- 70 -- 97 % -- --   09/30/24 1932 131/60 -- -- 75 -- 98 % -- --   09/30/24 1853 124/60 98.2 °F (36.8 °C) Oral 65 18 99 % -- --   09/30/24 1833 (!) 100/58 -- -- 64 -- 97 % -- --   09/30/24 1536 -- -- -- 69 -- 100 % -- --   09/30/24 1452 138/61 97.7 °F (36.5 °C) Oral 63 18 98 % 5' 7" (1.702 m) 50.3 kg (111 lb)       GENERAL APPEARANCE: Alert, well-developed, well-nourished female in no acute distress.  HEENT: Normocephalic and atraumatic.  Oropharynx unremarkable.  PULM: Normal respiratory effort. No accessory muscle use.  CV: RRR.  ABDOMEN: Soft, nontender.  EXTREMITIES: No obvious signs of vascular compromise. Pulses present. No cyanosis, clubbing or edema.  SKIN: Clear; no rashes, lesions or skin breaks in exposed areas.    NEURO:  MENTAL STATUS: Patient awake and oriented to time, place, and person, recent/remote memory normal, attention span/concentration normal, and speech fluent without paraphasic errors.  Affect euthymic.    CRANIAL NERVES:  CN I: Not tested.  CN II: Fundoscopic exam deferred.  CN III, IV, VI: Pupils surgical bilaterally.  Extraocular " movements full and intact.  CN V: Facial sensation normal.  CN VII: Facial asymmetry absent.  CN VIII: Hearing grossly normal and equal bilaterally.  No skew deviation or pathologic nystagmus.  CN IX, X: Palate elevates symmetrically. Speech/articulation is clear without dysarthria.  CN XI: Shoulder shrug and chin rotation equal with good strength.  CN XII: Tongue protrusion midline.    MOTOR:  Bulk normal. Tone normal and symmetric throughout.  Abnormal movements absent.  Tremor: none present.  Strength 5/5 throughout.    REFLEXES:  DTRs 2+ throughout.  Plantar response downgoing bilaterally.  SENSATION: grossly intact throughout.  COORDINATION: normal finger-to-nose.  STATION: not tested.  GAIT: not tested.      NIHSS:  1a      Level of Consciousness (alert, drowsy, etc.):   0=alert; keenly responsive  1b.     Level of Consciousness Questions (month, age): 0= able to answer both questions  1c.     Level of Consciousness Commands (open, close eyes, make fist, let go):  0=Answers both tasks correctly  2.      Best Gaze (eyes open - patient follows examiner's finger or face):      0=normal  3.      Visual (introduce visual stimulus/threat to patient's visual field quadrants):  0=No visual loss  4.      Facial Palsy (show teeth, raise eyebrows and squeeze eyes shut):        0=Normal symmetric movement  5a.     Motor Arm - Left (elevate extremity 90 degrees and score drift/movement):       0=No drift, limb holds 90 (or 45) degrees for full 10 seconds  5b.     Motor Arm - Right (elevate extremity 90 degrees and score drift/movement):      0=No drift, limb holds 90 (or 45) degrees for full 10 seconds  6a.     Motor Leg - Left (elevate extremity 30 degrees and score drift/movement):       0=No drift, limb holds 90 (or 45) degrees for full 10 seconds  6b      Motor Leg - Right (elevate extremity 30 degrees and score drift/movement):      0=No drift, limb holds 90 (or 45) degrees for full 10 seconds  7.      Limb Ataxia  "(finger-nose, heel down shin):      0=Absent  8.      Sensory (pin prick to face, arm, trunk, and leg - compare side to side):        0=Normal; no sensory loss  9.      Best Language (name items, describe a picture and read sentences):      0=No aphasia, normal  10.     Dysarthria (evaluate speech clarity by patient repeating listed words): 0=Normal  11.     Extinction and Inattention (use prior testing to identify neglect or double simultaneous stimuli testing):      0=No abnormality          NIH Stroke Scale Total:         0      Labs:  Recent Labs   Lab 09/30/24  1510 10/01/24  0302   * 140   K 4.0 3.3*   CL 98 101   CO2 22* 26   BUN 34* 27*   CREATININE 1.1 1.0    84   CALCIUM 9.6 9.5   PHOS  --  3.3   MG  --  2.4     Recent Labs   Lab 09/30/24  1510 10/01/24  0302   WBC 13.01* 9.33   HGB 12.7 11.7*   HCT 38.4 35.6*    266     Recent Labs   Lab 09/30/24  1510 10/01/24  0302   ALBUMIN 3.8 3.6   PROT 7.4 6.8   BILITOT 0.9 0.9   ALKPHOS 87 78   ALT 24 21   AST 32 28     Lab Results   Component Value Date    INR 1.0 10/01/2024     Lab Results   Component Value Date    TRIG 140 09/30/2024    HDL 78 (H) 09/30/2024    CHOLHDL 31.6 09/30/2024     Lab Results   Component Value Date    HGBA1C 5.9 (H) 09/05/2024     No results found for: "PROTEINCSF", "GLUCCSF", "WBCCSF"    Imaging:  I have reviewed and interpreted the pertinent imaging and lab results.      CTA Head and Neck (xpd)  Narrative: EXAMINATION:  CTA HEAD AND NECK (XPD)    CLINICAL HISTORY:  Neuro deficit, acute, stroke suspected;    TECHNIQUE:  Non contrast low dose axial images were obtained through the head. CT angiogram was performed from the level of the ortega to the top of the head following the IV administration of 75mL of Omnipaque 350.   Sagittal and coronal reconstructions and maximum intensity projection reconstructions were performed. Arterial stenosis percentages are based on NASCET measurement criteria.    COMPARISON:  MRI " brain 11/09/2023, CTA head neck 10/25/2023    FINDINGS:  Brain:    No evidence of acute intracranial hemorrhage.    The ventricles and sulci are appropriate in size and configuration for the patient's age without hydrocephalus.    Mild scattered hypoattenuation within the supratentorial white matter, nonspecific but probably reflecting sequelae of chronic small vessel ischemic change.  There is no significant mass effect, midline shift, or extra-axial fluid collection. Gray-white matter differentiation is within normal limits without evidence of an acute major vascular territory infarct.    No abnormal intracranial enhancement.    Bilateral lens replacements are noted.  The paranasal sinuses are normal.  The visualized portions of the mastoids are unremarkable. No acute calvarial fracture.    Extracranial:    Aorta and great vessels: Left-sided aortic arch with normal configuration.   No evidence of hemodynamically significant stenosis of the origins of the great vessels from the arch.    Subclavian arteries: The subclavian arteries are without hemodynamically significant stenosis or occlusion.    Right carotid: The right common carotid artery is without significant stenosis. Mild calcific atherosclerotic plaque at the carotid bifurcation.  The right internal carotid artery is without hemodynamically significant (>50%) stenosis, occlusion, or dissection.  Persistent luminal irregularity with somewhat beaded appearance of the proximal/mid cervical internal carotid artery, which may reflect changes of atherosclerotic disease or fibromuscular dysplasia, similar to prior exam.    Left carotid: The left common carotid artery is without significant stenosis. Moderate calcific atherosclerotic plaque at the carotid bifurcation.  The left internal carotid artery is without hemodynamically significant (>50%) stenosis, occlusion, or dissection.    Extracranial vertebral arteries: The left vertebral artery is dominant.  The  vertebral arteries are without significant stenosis, occlusion, or dissection to the skull base.    Intracranial:    Anterior circulation: Mild calcific atherosclerosis of bilateral carotid artery siphons.   No areas of significant atherosclerotic narrowing or filling defects are identified.  The middle cerebral arteries are normal.  Hypoplastic right anterior cerebral artery A1 segment with the A2 segment predominantly fed by the anterior communicating artery, normal developmental variant.  Left anterior cerebral artery is normal.    Posterior circulation: The vertebral arteries are normal. The basilar artery is normal. The posterior cerebral arteries are normal.    No evidence of aneurysm or arteriovenous malformation.    Dural venous sinuses are patent.    Other:    Left anterior chest wall pacemaker device with transvenous leads extending to the heart partially imaged.    The visualized portions of the lung apices are grossly clear.  Stable 3 mm ground-glass nodule in the left upper lobe.  Sub center calcified granulomas in the posterior right upper lobe.    Enlarged, multinodular thyroid gland, better characterized on prior dedicated thyroid ultrasound.    There are degenerative spine changes. No concerning osseous lesions identified.  Impression: 1. No evidence of an acute intracranial abnormality. Further evaluation with MRI could be performed, as clinically warranted.  2. Mild generalized cerebral volume loss and mild scattered nonspecific supratentorial white matter hypoattenuation probably reflecting sequelae of chronic small vessel ischemic change.  3. No intracranial high-grade stenosis, large vessel occlusion, aneurysm or arteriovenous malformation.  4. No hemodynamically significant stenosis, major branch occlusion, aneurysm or dissection in the neck arterial vasculature.  5. Stable beaded appearance of the right cervical internal carotid artery which may be secondary to atherosclerotic disease or  fibromuscular dysplasia.  6. Enlarged, multinodular thyroid gland, better characterized on prior dedicated thyroid ultrasound.  The significant finding above was relayed by myself  by telephone to Ross Villar MD on 09/30/2024 at 15:34.    Electronically signed by: Kaushal Forte  Date:    09/30/2024  Time:    15:38         ASSESSMENT & PLAN:      TIA  Afib    Plan:   Symptoms of left facial numbness could likely be TIA.  CT head was negative and CT angiogram head and neck showed no large vessel occlusion high-grade stenosis.  MRI brain can not be done due to pacemaker.    Recommend repeat CT head in 24 hours from symptom onset.  Continue with Eliquis 2.5 mg b.i.d. and aspirin 81 mg daily along with Zetia 10 mg nightly for stroke prevention.    Risk factor stratification with 2D echo, hemoglobin A1c and lipid panel.    PT OT evaluate and treat.    Normalize blood pressure.    Outpatient neurology follow-up           Thank you kindly for including us in the care of this patient. Please do not hesitate to contact us with any questions.         Javier Cormier MD  Neurology/vascular Neurology  Date of Service: 10/01/2024  9:32 AM    --------------------------------------------------------------------------------------------------------------------------------------------------------------------------------------------------------------------------------------------------------------  Please note: This note was transcribed using voice recognition software. Because of this technology there are often uinintended grammatical, spelling, and other transcription errors. Please disregard these errors.     No

## 2025-03-22 ENCOUNTER — NON-APPOINTMENT (OUTPATIENT)
Age: 40
End: 2025-03-22

## 2025-03-23 ENCOUNTER — NON-APPOINTMENT (OUTPATIENT)
Age: 40
End: 2025-03-23

## 2025-07-02 ENCOUNTER — NON-APPOINTMENT (OUTPATIENT)
Age: 40
End: 2025-07-02